# Patient Record
Sex: MALE | Race: WHITE | Employment: FULL TIME | URBAN - METROPOLITAN AREA
[De-identification: names, ages, dates, MRNs, and addresses within clinical notes are randomized per-mention and may not be internally consistent; named-entity substitution may affect disease eponyms.]

---

## 2020-01-30 NOTE — LETTER
AUTHORIZATION FOR SURGICAL OPERATION OR OTHER PROCEDURE    1. I hereby authorize Dr.Alex Winston Pardo and the [facility] staff assigned to my case to perform the following operation and/or procedure at the [facility]:   RIGHT subacromial CSI        2. My physician has explained the nature and purpose of the operation or other procedure, possible alternative methods of treatment, the risks involved, and the possibility of complication to me. I acknowledge that no guarantee has been made as to the result that may be obtained. 3.  I recognize that, during the course of this operation, or other procedure, unforseen conditions may necessitate additional or different procedure than those listed above. I, therefore, further authorize and request that the above named physician, his/her physician assistants or designees perform such procedures as are, in his/her professional opinion, necessary and desirable. 4.  Any tissue or organs removed in the operation or other procedure may be disposed of by and at the discretion of the [facility] staff and [facility]. 5.  I understand that in the event of a medical emergency, I will be transported by local paramedics to [facility] or other hospital emergency department. 6.  I certify that I have read and fully understand the above consent to operation and/or other procedure. 7.  I acknowledge that my physician has explained sedation/analgesia administration to me including the risks and benefits. I consent to the administration of sedation/analgesia as may be necessary or desirable in the judgement of my physician. Witness signature: ___________________________________________________ Date:  ______/______/_____                    Time:  ________ A. M.  P.M.        Patient Name:  Jarrell Dunham  12/13/1985  [id number]  ______________________________________________________  (please print)       Patient signature: ___________________________________________________             Relationship to Patient:           []  Parent    Responsible person                          []  Spouse  In case of minor or                    [] Other  _____________   Incompetent name:  __________________________________________________                               (please print)      _____________      Responsible person  In case of minor or  Incompetent signature:  _______________________________________________    Statement of Physician  My signature below affirms that prior to the time of the procedure, I have explained to the patient and/or his/her guardian, the risks and benefits involved in the proposed treatment and any reasonable alternative to the proposed treatment. I have also explained the risks and benefits involved in the refusal of the proposed treatment and have answered the patient's questions.                         Date:  ______/______/_______  Provider                      Signature:  __________________________________________________________       Time:  ___________ A.M    P.M. clear

## 2020-05-08 NOTE — PROGRESS NOTES
HPI:    Patient ID: Ridge Gonzalez is a 37year old male. HPI  Comes in today for establishing care and needing refill on his medications.   Denies any symptoms he just needs refill on his meds was removed to [place].  History of ADHD generalized anxiety a He is in respiratory distress. Neurological: He is alert and oriented to person, place, and time.               ASSESSMENT/PLAN:   Attention deficit hyperactivity disorder (adhd), unspecified adhd type  (primary encounter diagnosis)- will refill medicatio

## 2020-06-08 NOTE — PROGRESS NOTES
HPI:    Patient ID: Franca Anguiano is a 37year old male.     HPI  Comes in today for first time to establish care in office recently readmitted over visual telephone visit recently moved [facility] from [facility] and he needs a new physician refill on his meds he Mother    • Prostate Cancer Father    • Colon Cancer Father    • No Known Problems Brother    • Colon Cancer Maternal Grandfather    • Heart Disorder Maternal Grandfather       Social History: Social History    Tobacco Use      Smoking status: Former Smoke labs    No orders of the defined types were placed in this encounter.       Meds This Visit:  Requested Prescriptions     Signed Prescriptions Disp Refills   • amphetamine-dextroamphetamine 20 MG Oral Tab 60 tablet 0     Sig: Take 1 tablet by mouth 2 (two)

## 2020-07-01 ENCOUNTER — TELEPHONE (OUTPATIENT)
Dept: INTERNAL MEDICINE CLINIC | Facility: CLINIC | Age: 35
End: 2020-07-01

## 2020-07-01 RX ORDER — CLONAZEPAM 0.5 MG/1
0.5 TABLET ORAL 3 TIMES DAILY
Qty: 90 TABLET | Refills: 0 | Status: SHIPPED | OUTPATIENT
Start: 2020-07-05 | End: 2020-07-30

## 2020-07-01 RX ORDER — DEXTROAMPHETAMINE SACCHARATE, AMPHETAMINE ASPARTATE, DEXTROAMPHETAMINE SULFATE AND AMPHETAMINE SULFATE 5; 5; 5; 5 MG/1; MG/1; MG/1; MG/1
1 TABLET ORAL 2 TIMES DAILY
Qty: 60 TABLET | Refills: 0 | Status: SHIPPED | OUTPATIENT
Start: 2020-07-05 | End: 2020-07-02

## 2020-07-01 RX ORDER — ZOLPIDEM TARTRATE 12.5 MG/1
12.5 TABLET, FILM COATED, EXTENDED RELEASE ORAL NIGHTLY
Qty: 30 TABLET | Refills: 0 | Status: SHIPPED | OUTPATIENT
Start: 2020-07-05 | End: 2020-07-30

## 2020-07-01 NOTE — TELEPHONE ENCOUNTER
Dr. Cris Diop: patient states the pharmacy can dispense two days early that 7/5/20. He is leaving town. And he asked if you can approve to early refill.

## 2020-07-01 NOTE — TELEPHONE ENCOUNTER
clonazePAM 0.5 MG Oral Tab    Zolpidem Tartrate ER 12.5 MG Oral Tab CR     amphetamine-dextroamphetamine 20 MG Oral Tab       Patient calling for refill on medication       Please advise   [phone]

## 2020-07-02 RX ORDER — DEXTROAMPHETAMINE SACCHARATE, AMPHETAMINE ASPARTATE, DEXTROAMPHETAMINE SULFATE AND AMPHETAMINE SULFATE 5; 5; 5; 5 MG/1; MG/1; MG/1; MG/1
1 TABLET ORAL 2 TIMES DAILY
Qty: 60 TABLET | Refills: 0 | Status: SHIPPED | OUTPATIENT
Start: 2020-07-03 | End: 2020-07-30

## 2020-07-02 NOTE — TELEPHONE ENCOUNTER
spoke with pharmacist Julia Villalobos   States needs new prescription for adderall  Please write on comments ok for early refill  Previous prescription is locked and cannot refill until 7/5/20 that is why they need new Rx with comments \"ok for early refill\"  Thank

## 2020-07-03 NOTE — TELEPHONE ENCOUNTER
Spoke with patient and informed that Adderal was sent today to his pharmacy and just wait for his pharmacy to call for ,

## 2020-07-30 ENCOUNTER — OFFICE VISIT (OUTPATIENT)
Dept: INTERNAL MEDICINE CLINIC | Facility: CLINIC | Age: 35
End: 2020-07-30
Payer: COMMERCIAL

## 2020-07-30 VITALS
HEIGHT: 71 IN | BODY MASS INDEX: 22.68 KG/M2 | DIASTOLIC BLOOD PRESSURE: 62 MMHG | OXYGEN SATURATION: 99 % | WEIGHT: 162 LBS | SYSTOLIC BLOOD PRESSURE: 126 MMHG | RESPIRATION RATE: 18 BRPM | TEMPERATURE: 98 F | HEART RATE: 80 BPM

## 2020-07-30 DIAGNOSIS — G47.00 INSOMNIA, UNSPECIFIED TYPE: ICD-10-CM

## 2020-07-30 DIAGNOSIS — F41.1 GENERALIZED ANXIETY DISORDER: ICD-10-CM

## 2020-07-30 DIAGNOSIS — M25.511 ACUTE PAIN OF RIGHT SHOULDER: Primary | ICD-10-CM

## 2020-07-30 DIAGNOSIS — F90.9 ATTENTION DEFICIT HYPERACTIVITY DISORDER (ADHD), UNSPECIFIED ADHD TYPE: ICD-10-CM

## 2020-07-30 PROCEDURE — 3078F DIAST BP <80 MM HG: CPT | Performed by: INTERNAL MEDICINE

## 2020-07-30 PROCEDURE — 99214 OFFICE O/P EST MOD 30 MIN: CPT | Performed by: INTERNAL MEDICINE

## 2020-07-30 PROCEDURE — 3074F SYST BP LT 130 MM HG: CPT | Performed by: INTERNAL MEDICINE

## 2020-07-30 PROCEDURE — 3008F BODY MASS INDEX DOCD: CPT | Performed by: INTERNAL MEDICINE

## 2020-07-30 RX ORDER — CLONAZEPAM 0.5 MG/1
0.5 TABLET ORAL 3 TIMES DAILY
Qty: 90 TABLET | Refills: 0 | Status: SHIPPED | OUTPATIENT
Start: 2020-07-30 | End: 2020-08-28

## 2020-07-30 RX ORDER — DEXTROAMPHETAMINE SACCHARATE, AMPHETAMINE ASPARTATE, DEXTROAMPHETAMINE SULFATE AND AMPHETAMINE SULFATE 5; 5; 5; 5 MG/1; MG/1; MG/1; MG/1
1 TABLET ORAL 2 TIMES DAILY
Qty: 60 TABLET | Refills: 0 | Status: SHIPPED | OUTPATIENT
Start: 2020-07-30 | End: 2020-08-28

## 2020-07-30 RX ORDER — ZOLPIDEM TARTRATE 12.5 MG/1
12.5 TABLET, FILM COATED, EXTENDED RELEASE ORAL NIGHTLY
Qty: 30 TABLET | Refills: 0 | Status: SHIPPED | OUTPATIENT
Start: 2020-07-30 | End: 2020-08-28

## 2020-08-04 NOTE — PROGRESS NOTES
HPI:    Patient ID: Kiki Zhou is a 37year old male.     HPI  Presents for follow-up also is been having some right shoulder pain he is not sure how he hurt it was hurting for few weeks but the last few days is gotten better he has been icing it taking as 2000        Years since quittin.6      Smokeless tobacco: Never Used    Alcohol use: Yes      Frequency: Monthly or less      Comment: weekends    Drug use: Never       PHYSICAL EXAM:    Physical Exam   Constitutional: He is oriented to person, place, 3 (three) times daily. • amphetamine-dextroamphetamine 20 MG Oral Tab 60 tablet 0     Sig: Take 1 tablet by mouth 2 (two) times a day.        Imaging & Referrals:  None        #[id number]

## 2020-08-28 ENCOUNTER — OFFICE VISIT (OUTPATIENT)
Dept: INTERNAL MEDICINE CLINIC | Facility: CLINIC | Age: 35
End: 2020-08-28
Payer: COMMERCIAL

## 2020-08-28 VITALS
DIASTOLIC BLOOD PRESSURE: 66 MMHG | HEART RATE: 68 BPM | WEIGHT: 158 LBS | BODY MASS INDEX: 22.12 KG/M2 | TEMPERATURE: 98 F | HEIGHT: 71 IN | SYSTOLIC BLOOD PRESSURE: 120 MMHG | RESPIRATION RATE: 18 BRPM | OXYGEN SATURATION: 99 %

## 2020-08-28 DIAGNOSIS — F41.1 GENERALIZED ANXIETY DISORDER: ICD-10-CM

## 2020-08-28 DIAGNOSIS — G47.00 INSOMNIA, UNSPECIFIED TYPE: ICD-10-CM

## 2020-08-28 DIAGNOSIS — Z00.00 ANNUAL PHYSICAL EXAM: Primary | ICD-10-CM

## 2020-08-28 DIAGNOSIS — F90.9 ATTENTION DEFICIT HYPERACTIVITY DISORDER (ADHD), UNSPECIFIED ADHD TYPE: ICD-10-CM

## 2020-08-28 PROBLEM — Z80.42 FAMILY HISTORY OF PROSTATE CANCER IN FATHER: Status: ACTIVE | Noted: 2019-06-19

## 2020-08-28 PROBLEM — F41.9 ANXIETY: Status: ACTIVE | Noted: 2019-04-03

## 2020-08-28 PROCEDURE — 3074F SYST BP LT 130 MM HG: CPT | Performed by: INTERNAL MEDICINE

## 2020-08-28 PROCEDURE — 99395 PREV VISIT EST AGE 18-39: CPT | Performed by: INTERNAL MEDICINE

## 2020-08-28 PROCEDURE — 3008F BODY MASS INDEX DOCD: CPT | Performed by: INTERNAL MEDICINE

## 2020-08-28 PROCEDURE — 3078F DIAST BP <80 MM HG: CPT | Performed by: INTERNAL MEDICINE

## 2020-08-28 RX ORDER — DEXTROAMPHETAMINE SACCHARATE, AMPHETAMINE ASPARTATE, DEXTROAMPHETAMINE SULFATE AND AMPHETAMINE SULFATE 5; 5; 5; 5 MG/1; MG/1; MG/1; MG/1
1 TABLET ORAL 2 TIMES DAILY
Qty: 60 TABLET | Refills: 0 | Status: SHIPPED | OUTPATIENT
Start: 2020-08-28 | End: 2020-09-25

## 2020-08-28 RX ORDER — ZOLPIDEM TARTRATE 12.5 MG/1
12.5 TABLET, FILM COATED, EXTENDED RELEASE ORAL NIGHTLY
Qty: 30 TABLET | Refills: 0 | Status: SHIPPED | OUTPATIENT
Start: 2020-08-28 | End: 2020-09-25

## 2020-08-28 RX ORDER — CLONAZEPAM 0.5 MG/1
0.5 TABLET ORAL 3 TIMES DAILY
Qty: 90 TABLET | Refills: 0 | Status: SHIPPED | OUTPATIENT
Start: 2020-08-28 | End: 2020-09-25

## 2020-08-28 NOTE — PROGRESS NOTES
HPI:    Patient ID: Mona Hector is a 37year old male. HPI  Patient comes in for annual physical overall doing okay denies any complaints. Also needs refill on his meds. Review of Systems   Constitutional: Negative. Negative for fatigue and fever. Monthly or less      Comment: weekends    Drug use: Never       PHYSICAL EXAM:    Physical Exam   Constitutional: He is oriented to person, place, and time. He appears well-developed and well-nourished. HENT:   Head: Normocephalic and atraumatic.    Eyes: nightly. • clonazePAM 0.5 MG Oral Tab 90 tablet 0     Sig: Take 1 tablet (0.5 mg total) by mouth 3 (three) times daily. • amphetamine-dextroamphetamine 20 MG Oral Tab 60 tablet 0     Sig: Take 1 tablet by mouth 2 (two) times a day.        Imaging & Refe

## 2020-09-08 ENCOUNTER — OFFICE VISIT (OUTPATIENT)
Dept: INTERNAL MEDICINE CLINIC | Facility: CLINIC | Age: 35
End: 2020-09-08
Payer: COMMERCIAL

## 2020-09-08 VITALS
DIASTOLIC BLOOD PRESSURE: 68 MMHG | RESPIRATION RATE: 16 BRPM | SYSTOLIC BLOOD PRESSURE: 120 MMHG | HEIGHT: 71 IN | BODY MASS INDEX: 22.54 KG/M2 | TEMPERATURE: 98 F | WEIGHT: 161 LBS | OXYGEN SATURATION: 99 % | HEART RATE: 64 BPM

## 2020-09-08 DIAGNOSIS — M21.42 FLAT FEET, BILATERAL: ICD-10-CM

## 2020-09-08 DIAGNOSIS — L02.512 ABSCESS OF FINGER OF LEFT HAND: Primary | ICD-10-CM

## 2020-09-08 DIAGNOSIS — M21.41 FLAT FEET, BILATERAL: ICD-10-CM

## 2020-09-08 PROCEDURE — 99214 OFFICE O/P EST MOD 30 MIN: CPT | Performed by: INTERNAL MEDICINE

## 2020-09-08 PROCEDURE — 3074F SYST BP LT 130 MM HG: CPT | Performed by: INTERNAL MEDICINE

## 2020-09-08 PROCEDURE — 3008F BODY MASS INDEX DOCD: CPT | Performed by: INTERNAL MEDICINE

## 2020-09-08 PROCEDURE — 3078F DIAST BP <80 MM HG: CPT | Performed by: INTERNAL MEDICINE

## 2020-09-08 RX ORDER — ACETAMINOPHEN AND CODEINE PHOSPHATE 300; 30 MG/1; MG/1
1 TABLET ORAL EVERY 6 HOURS PRN
Qty: 15 TABLET | Refills: 0 | Status: SHIPPED | OUTPATIENT
Start: 2020-09-08 | End: 2021-02-20

## 2020-09-08 RX ORDER — CLINDAMYCIN HYDROCHLORIDE 300 MG/1
300 CAPSULE ORAL 3 TIMES DAILY
Qty: 30 CAPSULE | Refills: 0 | Status: SHIPPED | OUTPATIENT
Start: 2020-09-08 | End: 2020-09-18

## 2020-09-09 NOTE — PROGRESS NOTES
HPI:    Patient ID: Zonia Beckham is a 37year old male. HPI  Patient comes in today with complaint of finger swelling pain for about a week and left hand if it is getting get better he tried to soak it in warm water but nothing helped.   The pain at times day. 60 tablet 0     Allergies:No Known Allergies    HISTORY:  No past medical history on file. No past surgical history on file.    Family History   Problem Relation Age of Onset   • High Blood Pressure Mother    • Prostate Cancer Father    • Colon Cance discharge came out patient felt immediate relief. Flat feet, bilateral patient will see the podiatrist    No orders of the defined types were placed in this encounter.       Meds This Visit:  Requested Prescriptions     Signed Prescriptions Disp Refills

## 2020-09-16 NOTE — TELEPHONE ENCOUNTER
Patient seen in [address] [place] [address] Rd 9/8 for abscess to finger, was prescribed Bactroban oint. Pt is out of oint and requesting refill. Please advise how long patient should be using oint.

## 2020-09-16 NOTE — TELEPHONE ENCOUNTER
Patient is requesting a refill for bactroban. Please advise.        mupirocin (BACTROBAN) 2 % External Ointment 15 g

## 2020-09-21 ENCOUNTER — LAB ENCOUNTER (OUTPATIENT)
Dept: LAB | Facility: HOSPITAL | Age: 35
End: 2020-09-21
Attending: INTERNAL MEDICINE
Payer: COMMERCIAL

## 2020-09-21 DIAGNOSIS — Z00.00 ANNUAL PHYSICAL EXAM: ICD-10-CM

## 2020-09-21 LAB
ALBUMIN SERPL-MCNC: 4.2 G/DL (ref 3.4–5)
ALBUMIN/GLOB SERPL: 1.3 {RATIO} (ref 1–2)
ALP LIVER SERPL-CCNC: 98 U/L
ALT SERPL-CCNC: 57 U/L
ANION GAP SERPL CALC-SCNC: 4 MMOL/L (ref 0–18)
AST SERPL-CCNC: 50 U/L (ref 15–37)
BACTERIA UR QL AUTO: NEGATIVE /HPF
BASOPHILS # BLD AUTO: 0.04 X10(3) UL (ref 0–0.2)
BASOPHILS NFR BLD AUTO: 0.6 %
BILIRUB SERPL-MCNC: 0.5 MG/DL (ref 0.1–2)
BILIRUB UR QL: NEGATIVE
BUN BLD-MCNC: 17 MG/DL (ref 7–18)
BUN/CREAT SERPL: 13 (ref 10–20)
CALCIUM BLD-MCNC: 9.2 MG/DL (ref 8.5–10.1)
CHLORIDE SERPL-SCNC: 102 MMOL/L (ref 98–112)
CHOLEST SMN-MCNC: 158 MG/DL (ref ?–200)
CO2 SERPL-SCNC: 32 MMOL/L (ref 21–32)
COLOR UR: YELLOW
CREAT BLD-MCNC: 1.31 MG/DL
DEPRECATED RDW RBC AUTO: 38.7 FL (ref 35.1–46.3)
EOSINOPHIL # BLD AUTO: 0.03 X10(3) UL (ref 0–0.7)
EOSINOPHIL NFR BLD AUTO: 0.5 %
ERYTHROCYTE [DISTWIDTH] IN BLOOD BY AUTOMATED COUNT: 12 % (ref 11–15)
GLOBULIN PLAS-MCNC: 3.2 G/DL (ref 2.8–4.4)
GLUCOSE BLD-MCNC: 101 MG/DL (ref 70–99)
GLUCOSE UR-MCNC: NEGATIVE MG/DL
HCT VFR BLD AUTO: 42.5 %
HDLC SERPL-MCNC: 79 MG/DL (ref 40–59)
HGB BLD-MCNC: 14.8 G/DL
HGB UR QL STRIP.AUTO: NEGATIVE
HYALINE CASTS #/AREA URNS AUTO: 1 /LPF
IMM GRANULOCYTES # BLD AUTO: 0.03 X10(3) UL (ref 0–1)
IMM GRANULOCYTES NFR BLD: 0.5 %
KETONES UR-MCNC: NEGATIVE MG/DL
LDLC SERPL CALC-MCNC: 72 MG/DL (ref ?–100)
LEUKOCYTE ESTERASE UR QL STRIP.AUTO: NEGATIVE
LYMPHOCYTES # BLD AUTO: 1.74 X10(3) UL (ref 1–4)
LYMPHOCYTES NFR BLD AUTO: 26.2 %
M PROTEIN MFR SERPL ELPH: 7.4 G/DL (ref 6.4–8.2)
MCH RBC QN AUTO: 30.5 PG (ref 26–34)
MCHC RBC AUTO-ENTMCNC: 34.8 G/DL (ref 31–37)
MCV RBC AUTO: 87.4 FL
MONOCYTES # BLD AUTO: 0.54 X10(3) UL (ref 0.1–1)
MONOCYTES NFR BLD AUTO: 8.1 %
NEUTROPHILS # BLD AUTO: 4.27 X10 (3) UL (ref 1.5–7.7)
NEUTROPHILS # BLD AUTO: 4.27 X10(3) UL (ref 1.5–7.7)
NEUTROPHILS NFR BLD AUTO: 64.1 %
NITRITE UR QL STRIP.AUTO: NEGATIVE
NONHDLC SERPL-MCNC: 79 MG/DL (ref ?–130)
OSMOLALITY SERPL CALC.SUM OF ELEC: 288 MOSM/KG (ref 275–295)
PATIENT FASTING Y/N/NP: YES
PATIENT FASTING Y/N/NP: YES
PH UR: 7 [PH] (ref 5–8)
PLATELET # BLD AUTO: 257 10(3)UL (ref 150–450)
POTASSIUM SERPL-SCNC: 4.1 MMOL/L (ref 3.5–5.1)
PROT UR-MCNC: NEGATIVE MG/DL
RBC # BLD AUTO: 4.86 X10(6)UL
RBC #/AREA URNS AUTO: 1 /HPF
SODIUM SERPL-SCNC: 138 MMOL/L (ref 136–145)
SP GR UR STRIP: 1.02 (ref 1–1.03)
TRIGL SERPL-MCNC: 33 MG/DL (ref 30–149)
TSI SER-ACNC: 1.71 MIU/ML (ref 0.36–3.74)
UROBILINOGEN UR STRIP-ACNC: <2
VLDLC SERPL CALC-MCNC: 7 MG/DL (ref 0–30)
WBC # BLD AUTO: 6.7 X10(3) UL (ref 4–11)
WBC #/AREA URNS AUTO: 1 /HPF

## 2020-09-21 PROCEDURE — 36415 COLL VENOUS BLD VENIPUNCTURE: CPT

## 2020-09-21 PROCEDURE — 80053 COMPREHEN METABOLIC PANEL: CPT

## 2020-09-21 PROCEDURE — 81001 URINALYSIS AUTO W/SCOPE: CPT

## 2020-09-21 PROCEDURE — 80061 LIPID PANEL: CPT

## 2020-09-21 PROCEDURE — 84443 ASSAY THYROID STIM HORMONE: CPT

## 2020-09-21 PROCEDURE — 85025 COMPLETE CBC W/AUTO DIFF WBC: CPT

## 2020-09-25 ENCOUNTER — OFFICE VISIT (OUTPATIENT)
Dept: INTERNAL MEDICINE CLINIC | Facility: CLINIC | Age: 35
End: 2020-09-25
Payer: COMMERCIAL

## 2020-09-25 VITALS
WEIGHT: 165 LBS | TEMPERATURE: 97 F | BODY MASS INDEX: 23 KG/M2 | HEART RATE: 67 BPM | SYSTOLIC BLOOD PRESSURE: 110 MMHG | DIASTOLIC BLOOD PRESSURE: 60 MMHG | OXYGEN SATURATION: 98 %

## 2020-09-25 DIAGNOSIS — F41.9 ANXIETY: ICD-10-CM

## 2020-09-25 DIAGNOSIS — G47.00 INSOMNIA, UNSPECIFIED TYPE: ICD-10-CM

## 2020-09-25 DIAGNOSIS — F41.1 GENERALIZED ANXIETY DISORDER: ICD-10-CM

## 2020-09-25 DIAGNOSIS — F98.8 ATTENTION DEFICIT DISORDER, UNSPECIFIED HYPERACTIVITY PRESENCE: ICD-10-CM

## 2020-09-25 DIAGNOSIS — N28.9 RENAL INSUFFICIENCY: ICD-10-CM

## 2020-09-25 DIAGNOSIS — R79.89 ELEVATED LFTS: Primary | ICD-10-CM

## 2020-09-25 PROCEDURE — 3078F DIAST BP <80 MM HG: CPT | Performed by: INTERNAL MEDICINE

## 2020-09-25 PROCEDURE — 99214 OFFICE O/P EST MOD 30 MIN: CPT | Performed by: INTERNAL MEDICINE

## 2020-09-25 PROCEDURE — 3074F SYST BP LT 130 MM HG: CPT | Performed by: INTERNAL MEDICINE

## 2020-09-25 RX ORDER — SULFAMETHOXAZOLE AND TRIMETHOPRIM 800; 160 MG/1; MG/1
1 TABLET ORAL 2 TIMES DAILY
Qty: 14 TABLET | Refills: 0 | Status: SHIPPED | OUTPATIENT
Start: 2020-09-25 | End: 2020-10-02

## 2020-09-25 RX ORDER — ZOLPIDEM TARTRATE 12.5 MG/1
12.5 TABLET, FILM COATED, EXTENDED RELEASE ORAL NIGHTLY
Qty: 30 TABLET | Refills: 0 | Status: SHIPPED | OUTPATIENT
Start: 2020-09-25 | End: 2020-10-23

## 2020-09-25 RX ORDER — DEXTROAMPHETAMINE SACCHARATE, AMPHETAMINE ASPARTATE, DEXTROAMPHETAMINE SULFATE AND AMPHETAMINE SULFATE 5; 5; 5; 5 MG/1; MG/1; MG/1; MG/1
1 TABLET ORAL 2 TIMES DAILY
Qty: 60 TABLET | Refills: 0 | Status: SHIPPED | OUTPATIENT
Start: 2020-09-25 | End: 2020-10-23

## 2020-09-25 RX ORDER — CLONAZEPAM 0.5 MG/1
0.5 TABLET ORAL 3 TIMES DAILY
Qty: 90 TABLET | Refills: 0 | Status: SHIPPED | OUTPATIENT
Start: 2020-09-25 | End: 2020-10-23

## 2020-09-25 NOTE — PROGRESS NOTES
HPI:    Patient ID: Milly Acevedo is a 37year old male. HPI  Is in today for follow-up. Continues to come in a day or 2 of fluids refill has been doing this for few months now.   Labs noted with a mild elevated LFTs also HDL elevated spoke with patient a tablet by mouth every 6 (six) hours as needed for Pain. 15 tablet 0     Allergies:No Known Allergies    HISTORY:  No past medical history on file. No past surgical history on file.    Family History   Problem Relation Age of Onset   • High Blood Pressure these medications we will repeat some labs  Attention deficit disorder, unspecified hyperactivity presence continue with current treatment  Anxiety continue with current treatment  Generalized anxiety disorder continue with current treatment take medicatio

## 2020-10-12 ENCOUNTER — APPOINTMENT (OUTPATIENT)
Dept: LAB | Age: 35
End: 2020-10-12
Payer: COMMERCIAL

## 2020-10-12 ENCOUNTER — LAB ENCOUNTER (OUTPATIENT)
Dept: LAB | Facility: HOSPITAL | Age: 35
End: 2020-10-12
Attending: INTERNAL MEDICINE
Payer: COMMERCIAL

## 2020-10-12 DIAGNOSIS — F98.8 ATTENTION DEFICIT DISORDER, UNSPECIFIED HYPERACTIVITY PRESENCE: ICD-10-CM

## 2020-10-12 DIAGNOSIS — F41.9 ANXIETY: ICD-10-CM

## 2020-10-12 DIAGNOSIS — R79.89 ELEVATED LFTS: ICD-10-CM

## 2020-10-12 DIAGNOSIS — F41.1 GENERALIZED ANXIETY DISORDER: ICD-10-CM

## 2020-10-12 DIAGNOSIS — G47.00 INSOMNIA, UNSPECIFIED TYPE: ICD-10-CM

## 2020-10-12 PROCEDURE — 36415 COLL VENOUS BLD VENIPUNCTURE: CPT

## 2020-10-12 PROCEDURE — 82728 ASSAY OF FERRITIN: CPT

## 2020-10-12 PROCEDURE — 86704 HEP B CORE ANTIBODY TOTAL: CPT

## 2020-10-12 PROCEDURE — 86708 HEPATITIS A ANTIBODY: CPT

## 2020-10-12 PROCEDURE — 87340 HEPATITIS B SURFACE AG IA: CPT

## 2020-10-12 PROCEDURE — 86803 HEPATITIS C AB TEST: CPT

## 2020-10-12 PROCEDURE — 80053 COMPREHEN METABOLIC PANEL: CPT | Performed by: INTERNAL MEDICINE

## 2020-10-12 PROCEDURE — 80500 HEPATITIS A B + C PROFILE: CPT

## 2020-10-12 PROCEDURE — 86038 ANTINUCLEAR ANTIBODIES: CPT

## 2020-10-12 PROCEDURE — 86706 HEP B SURFACE ANTIBODY: CPT

## 2020-10-12 PROCEDURE — 86039 ANTINUCLEAR ANTIBODIES (ANA): CPT

## 2020-10-20 ENCOUNTER — TELEPHONE (OUTPATIENT)
Dept: OTHER | Age: 35
End: 2020-10-20

## 2020-10-20 NOTE — TELEPHONE ENCOUNTER
Received call back from the patient. Reviewed chart. Noted below result not and relayed message. Wayne Garcias MD   10/18/2020  2:06 PM      Farzana + and titer slight elevated, ??  Non specific but should see the rheumatologist     Patient verbalized und

## 2020-10-23 NOTE — PROGRESS NOTES
HPI:    Patient ID: Faisal House is a 37year old male.     HPI  Patient comes in for follow-up overall he is doing okay denies any complaints today his labs are better liver enzymes back to normal renal function normal since he was really dehydrated t topically 3 (three) times daily. 15 g 0   • Acetaminophen-Codeine (TYLENOL WITH CODEINE #3) 300-30 MG Oral Tab Take 1 tablet by mouth every 6 (six) hours as needed for Pain.  15 tablet 0     Allergies:No Known Allergies    HISTORY:  No past medical history Attention deficit disorder, unspecified hyperactivity presence  (primary encounter diagnosis)-continue with current treatment explained to patient that we can refill the medication on the same date every month from now on going forward  Elevated lfts res

## 2020-11-07 ENCOUNTER — TELEPHONE (OUTPATIENT)
Dept: INTERNAL MEDICINE CLINIC | Facility: CLINIC | Age: 35
End: 2020-11-07

## 2020-11-07 NOTE — TELEPHONE ENCOUNTER
Pt coming in 11/25 for 2nd Hep B vaccine. Please place order in [facility]     Thanks.

## 2020-11-09 ENCOUNTER — OFFICE VISIT (OUTPATIENT)
Dept: RHEUMATOLOGY | Facility: CLINIC | Age: 35
End: 2020-11-09
Payer: COMMERCIAL

## 2020-11-09 VITALS
BODY MASS INDEX: 22.16 KG/M2 | HEIGHT: 71 IN | WEIGHT: 158.31 LBS | SYSTOLIC BLOOD PRESSURE: 142 MMHG | DIASTOLIC BLOOD PRESSURE: 84 MMHG | HEART RATE: 87 BPM

## 2020-11-09 DIAGNOSIS — R76.8 POSITIVE ANA (ANTINUCLEAR ANTIBODY): Primary | ICD-10-CM

## 2020-11-09 PROCEDURE — 3077F SYST BP >= 140 MM HG: CPT | Performed by: INTERNAL MEDICINE

## 2020-11-09 PROCEDURE — 3008F BODY MASS INDEX DOCD: CPT | Performed by: INTERNAL MEDICINE

## 2020-11-09 PROCEDURE — 99243 OFF/OP CNSLTJ NEW/EST LOW 30: CPT | Performed by: INTERNAL MEDICINE

## 2020-11-09 PROCEDURE — 3079F DIAST BP 80-89 MM HG: CPT | Performed by: INTERNAL MEDICINE

## 2020-11-09 PROCEDURE — 99072 ADDL SUPL MATRL&STAF TM PHE: CPT | Performed by: INTERNAL MEDICINE

## 2020-11-09 RX ORDER — TRAZODONE HYDROCHLORIDE 150 MG/1
150 TABLET ORAL AS NEEDED
COMMUNITY
Start: 2020-10-30

## 2020-11-09 RX ORDER — ESCITALOPRAM OXALATE 10 MG/1
10 TABLET ORAL DAILY
COMMUNITY
Start: 2020-10-30

## 2020-11-23 RX ORDER — ZOLPIDEM TARTRATE 12.5 MG/1
12.5 TABLET, FILM COATED, EXTENDED RELEASE ORAL NIGHTLY
Qty: 30 TABLET | Refills: 0 | OUTPATIENT
Start: 2020-11-23

## 2020-11-23 RX ORDER — ZOLPIDEM TARTRATE 12.5 MG/1
12.5 TABLET, FILM COATED, EXTENDED RELEASE ORAL NIGHTLY
Qty: 30 TABLET | Refills: 0 | Status: CANCELLED | OUTPATIENT
Start: 2020-11-23

## 2020-11-23 RX ORDER — CLONAZEPAM 0.5 MG/1
0.5 TABLET ORAL 3 TIMES DAILY
Qty: 90 TABLET | Refills: 0 | OUTPATIENT
Start: 2020-11-23

## 2020-11-23 RX ORDER — DEXTROAMPHETAMINE SACCHARATE, AMPHETAMINE ASPARTATE, DEXTROAMPHETAMINE SULFATE AND AMPHETAMINE SULFATE 5; 5; 5; 5 MG/1; MG/1; MG/1; MG/1
1 TABLET ORAL 2 TIMES DAILY
Qty: 60 TABLET | Refills: 0 | Status: CANCELLED | OUTPATIENT
Start: 2020-11-23

## 2020-11-23 RX ORDER — CLONAZEPAM 0.5 MG/1
0.5 TABLET ORAL 3 TIMES DAILY
Qty: 90 TABLET | Refills: 0 | Status: CANCELLED | OUTPATIENT
Start: 2020-11-23

## 2020-11-23 NOTE — TELEPHONE ENCOUNTER
Patient is requesting a refill for zolpidem, clonazepam, and adderall. Patient is out of medication. Please advise.        Zolpidem Tartrate ER 12.5 MG Oral Tab CR 30 tablet     clonazePAM 0.5 MG Oral Tab 90 tablet     amphetamine-dextroamphetamine 20 MG Or

## 2020-11-25 ENCOUNTER — NURSE ONLY (OUTPATIENT)
Dept: INTERNAL MEDICINE CLINIC | Facility: CLINIC | Age: 35
End: 2020-11-25
Payer: COMMERCIAL

## 2020-11-25 DIAGNOSIS — Z92.29 HAS RECEIVED SECOND DOSE OF HEPATITIS B VACCINE: Primary | ICD-10-CM

## 2020-11-25 PROCEDURE — 90746 HEPB VACCINE 3 DOSE ADULT IM: CPT | Performed by: INTERNAL MEDICINE

## 2020-11-25 PROCEDURE — 90471 IMMUNIZATION ADMIN: CPT | Performed by: INTERNAL MEDICINE

## 2020-12-21 RX ORDER — DEXTROAMPHETAMINE SACCHARATE, AMPHETAMINE ASPARTATE, DEXTROAMPHETAMINE SULFATE AND AMPHETAMINE SULFATE 5; 5; 5; 5 MG/1; MG/1; MG/1; MG/1
1 TABLET ORAL 2 TIMES DAILY
Qty: 60 TABLET | Refills: 0 | Status: SHIPPED | OUTPATIENT
Start: 2020-12-24 | End: 2021-01-23

## 2020-12-21 RX ORDER — CLONAZEPAM 0.5 MG/1
0.5 TABLET ORAL 3 TIMES DAILY
Qty: 90 TABLET | Refills: 0 | Status: SHIPPED | OUTPATIENT
Start: 2020-12-24 | End: 2021-01-20

## 2020-12-21 RX ORDER — ZOLPIDEM TARTRATE 12.5 MG/1
12.5 TABLET, FILM COATED, EXTENDED RELEASE ORAL NIGHTLY
Qty: 30 TABLET | Refills: 0 | Status: SHIPPED | OUTPATIENT
Start: 2020-12-24 | End: 2021-01-20

## 2020-12-21 NOTE — TELEPHONE ENCOUNTER
Current Outpatient Medications   Medication Sig Dispense Refill   • Zolpidem Tartrate ER 12.5 MG Oral Tab CR Take 1 tablet (12.5 mg total) by mouth nightly.  30 tablet 0   • clonazePAM 0.5 MG Oral Tab Take 1 tablet (0.5 mg total) by mouth 3 (three) times da

## 2021-01-20 RX ORDER — DEXTROAMPHETAMINE SACCHARATE, AMPHETAMINE ASPARTATE, DEXTROAMPHETAMINE SULFATE AND AMPHETAMINE SULFATE 5; 5; 5; 5 MG/1; MG/1; MG/1; MG/1
20 TABLET ORAL 2 TIMES DAILY
Qty: 60 TABLET | Refills: 0 | Status: SHIPPED | OUTPATIENT
Start: 2021-01-23 | End: 2021-02-19

## 2021-01-20 RX ORDER — ZOLPIDEM TARTRATE 12.5 MG/1
12.5 TABLET, FILM COATED, EXTENDED RELEASE ORAL NIGHTLY
Qty: 30 TABLET | Refills: 0 | Status: SHIPPED | OUTPATIENT
Start: 2021-01-23 | End: 2021-02-19

## 2021-01-20 RX ORDER — DEXTROAMPHETAMINE SACCHARATE, AMPHETAMINE ASPARTATE, DEXTROAMPHETAMINE SULFATE AND AMPHETAMINE SULFATE 5; 5; 5; 5 MG/1; MG/1; MG/1; MG/1
20 TABLET ORAL 2 TIMES DAILY
Qty: 60 TABLET | Refills: 0 | OUTPATIENT
Start: 2021-02-20 | End: 2021-03-22

## 2021-01-20 RX ORDER — CLONAZEPAM 0.5 MG/1
0.5 TABLET ORAL 3 TIMES DAILY
Qty: 90 TABLET | Refills: 1 | Status: SHIPPED | OUTPATIENT
Start: 2021-01-23 | End: 2021-02-19

## 2021-01-20 RX ORDER — DEXTROAMPHETAMINE SACCHARATE, AMPHETAMINE ASPARTATE, DEXTROAMPHETAMINE SULFATE AND AMPHETAMINE SULFATE 5; 5; 5; 5 MG/1; MG/1; MG/1; MG/1
20 TABLET ORAL 2 TIMES DAILY
Qty: 60 TABLET | Refills: 0 | OUTPATIENT
Start: 2021-03-23 | End: 2021-04-22

## 2021-01-20 NOTE — TELEPHONE ENCOUNTER
Patient is requesting a refill for 3 medications:   clonazePAM 0.5 MG Oral Tab - out after 1/21    Zolpidem Tartrate ER 12.5MG Oral Tab    Amphetamine-dextroamphetamine 20MG Oral Tab  (Adderall)    Send to [facility] in [place]

## 2021-02-19 NOTE — PROGRESS NOTES
HPI:    Patient ID: Abimael Angel is a 38year old male.     HPI  Wrong note   Review of Systems      Current Outpatient Medications   Medication Sig Dispense Refill   • amphetamine-dextroamphetamine (ADDERALL) 20 MG Oral Tab Take 1 tablet (20 mg total)

## 2021-02-20 NOTE — PROGRESS NOTES
HPI:    Patient ID: Nixon Viramontes is a 38year old male. HPI  Patient comes in for follow-up overall is doing okay denies any complaints he says he and his wife  Are trying to get pregnant.   Taking medication as prescribed he says he is not drinking Reported on 11/9/2020 ) 15 tablet 0     Allergies:No Known Allergies    HISTORY:  No past medical history on file. No past surgical history on file.    Family History   Problem Relation Age of Onset   • High Blood Pressure Mother    • Prostate Cancer Fath stable taking medication as prescribed  Insomnia, unspecified type take medication as prescribed      No orders of the defined types were placed in this encounter.       Meds This Visit:  Requested Prescriptions     Signed Prescriptions Disp Refills   • amp

## 2021-03-19 RX ORDER — DEXTROAMPHETAMINE SACCHARATE, AMPHETAMINE ASPARTATE, DEXTROAMPHETAMINE SULFATE AND AMPHETAMINE SULFATE 5; 5; 5; 5 MG/1; MG/1; MG/1; MG/1
20 TABLET ORAL 2 TIMES DAILY
Qty: 60 TABLET | Refills: 0 | Status: SHIPPED | OUTPATIENT
Start: 2021-03-19 | End: 2021-04-15

## 2021-03-19 RX ORDER — CLONAZEPAM 0.5 MG/1
0.5 TABLET ORAL 3 TIMES DAILY
Qty: 90 TABLET | Refills: 1 | Status: SHIPPED | OUTPATIENT
Start: 2021-03-19 | End: 2021-04-15

## 2021-03-19 RX ORDER — ZOLPIDEM TARTRATE 12.5 MG/1
12.5 TABLET, FILM COATED, EXTENDED RELEASE ORAL NIGHTLY
Qty: 30 TABLET | Refills: 0 | Status: SHIPPED | OUTPATIENT
Start: 2021-03-19 | End: 2021-04-15

## 2021-03-19 NOTE — TELEPHONE ENCOUNTER
Per patient he needs a refill on his rx med and he is out of med:  amphetamine-dextroamphetamine (ADDERALL)    Zolpidem Tartrate ER   clonazePAM     Current Outpatient Medications   Medication Sig Dispense Refill   • amphetamine-dextroamphetamine (ADDERALL

## 2021-04-15 ENCOUNTER — OFFICE VISIT (OUTPATIENT)
Dept: INTERNAL MEDICINE CLINIC | Facility: CLINIC | Age: 36
End: 2021-04-15
Payer: COMMERCIAL

## 2021-04-15 VITALS
BODY MASS INDEX: 21.84 KG/M2 | WEIGHT: 156 LBS | DIASTOLIC BLOOD PRESSURE: 78 MMHG | SYSTOLIC BLOOD PRESSURE: 116 MMHG | TEMPERATURE: 98 F | HEIGHT: 71 IN | HEART RATE: 67 BPM | RESPIRATION RATE: 17 BRPM | OXYGEN SATURATION: 98 %

## 2021-04-15 DIAGNOSIS — F41.9 ANXIETY: ICD-10-CM

## 2021-04-15 DIAGNOSIS — G47.00 INSOMNIA, UNSPECIFIED TYPE: ICD-10-CM

## 2021-04-15 DIAGNOSIS — R21 RASH: Primary | ICD-10-CM

## 2021-04-15 DIAGNOSIS — F98.8 ATTENTION DEFICIT DISORDER, UNSPECIFIED HYPERACTIVITY PRESENCE: ICD-10-CM

## 2021-04-15 PROCEDURE — 99214 OFFICE O/P EST MOD 30 MIN: CPT | Performed by: INTERNAL MEDICINE

## 2021-04-15 PROCEDURE — 3074F SYST BP LT 130 MM HG: CPT | Performed by: INTERNAL MEDICINE

## 2021-04-15 PROCEDURE — 3008F BODY MASS INDEX DOCD: CPT | Performed by: INTERNAL MEDICINE

## 2021-04-15 PROCEDURE — 3078F DIAST BP <80 MM HG: CPT | Performed by: INTERNAL MEDICINE

## 2021-04-15 RX ORDER — CLOTRIMAZOLE 1 %
1 CREAM (GRAM) TOPICAL 2 TIMES DAILY
Qty: 40 G | Refills: 0 | Status: SHIPPED | OUTPATIENT
Start: 2021-04-15

## 2021-04-15 RX ORDER — DEXTROAMPHETAMINE SACCHARATE, AMPHETAMINE ASPARTATE, DEXTROAMPHETAMINE SULFATE AND AMPHETAMINE SULFATE 5; 5; 5; 5 MG/1; MG/1; MG/1; MG/1
20 TABLET ORAL 2 TIMES DAILY
Qty: 60 TABLET | Refills: 0 | Status: SHIPPED | OUTPATIENT
Start: 2021-04-18 | End: 2021-05-14

## 2021-04-15 RX ORDER — ZOLPIDEM TARTRATE 12.5 MG/1
12.5 TABLET, FILM COATED, EXTENDED RELEASE ORAL NIGHTLY
Qty: 30 TABLET | Refills: 0 | Status: SHIPPED | OUTPATIENT
Start: 2021-04-18 | End: 2021-05-14

## 2021-04-15 RX ORDER — CLONAZEPAM 0.5 MG/1
0.5 TABLET ORAL 3 TIMES DAILY
Qty: 90 TABLET | Refills: 1 | Status: SHIPPED | OUTPATIENT
Start: 2021-04-18 | End: 2021-05-14

## 2021-04-15 NOTE — PROGRESS NOTES
HPI/Subjective:   Patient ID: Norma Hilton is a 38year old male.     HPI    Patient comes in with complaint of rash to his left arm EEG is 1 lesion has been there for a week now not sure if it is a fungal but no other lesions anywhere else or anybody nursing note reviewed. Constitutional:       Appearance: He is well-developed. HENT:      Head: Normocephalic and atraumatic.       Right Ear: External ear normal.      Left Ear: External ear normal.      Nose: Nose normal.   Eyes:      Conjunctiva/scle times daily. • Zolpidem Tartrate ER 12.5 MG Oral Tab CR 30 tablet 0     Sig: Take 1 tablet (12.5 mg total) by mouth nightly. • clotrimazole 1 % External Cream 40 g 0     Sig: Apply 1 Application topically 2 (two) times daily.        Imaging & Referrals:

## 2021-05-14 RX ORDER — DEXTROAMPHETAMINE SACCHARATE, AMPHETAMINE ASPARTATE, DEXTROAMPHETAMINE SULFATE AND AMPHETAMINE SULFATE 5; 5; 5; 5 MG/1; MG/1; MG/1; MG/1
20 TABLET ORAL 2 TIMES DAILY
Qty: 60 TABLET | Refills: 0 | Status: SHIPPED | OUTPATIENT
Start: 2021-05-17 | End: 2021-06-16

## 2021-05-14 RX ORDER — ZOLPIDEM TARTRATE 12.5 MG/1
12.5 TABLET, FILM COATED, EXTENDED RELEASE ORAL NIGHTLY
Qty: 30 TABLET | Refills: 0 | Status: SHIPPED | OUTPATIENT
Start: 2021-05-17 | End: 2021-09-09

## 2021-05-14 RX ORDER — CLONAZEPAM 0.5 MG/1
0.5 TABLET ORAL 3 TIMES DAILY
Qty: 90 TABLET | Refills: 1 | Status: SHIPPED | OUTPATIENT
Start: 2021-05-17 | End: 2021-09-09

## 2021-05-14 NOTE — TELEPHONE ENCOUNTER
Patient called and advised he is running Very Low on the Medications listed below. Patient is requesting a refill. Please Advise. Please Use Pharmacy[id number] [address]   [address] [facility]    Medications Needed

## 2021-07-04 ENCOUNTER — HOSPITAL ENCOUNTER (EMERGENCY)
Facility: HOSPITAL | Age: 36
Discharge: HOME | End: 2021-07-04
Attending: EMERGENCY MEDICINE
Payer: COMMERCIAL

## 2021-07-04 VITALS
HEIGHT: 71 IN | RESPIRATION RATE: 15 BRPM | SYSTOLIC BLOOD PRESSURE: 121 MMHG | TEMPERATURE: 99 F | DIASTOLIC BLOOD PRESSURE: 73 MMHG | OXYGEN SATURATION: 98 % | WEIGHT: 160 LBS | BODY MASS INDEX: 22.4 KG/M2 | HEART RATE: 94 BPM

## 2021-07-04 DIAGNOSIS — R10.13 DYSPEPSIA: Primary | ICD-10-CM

## 2021-07-04 LAB
ALBUMIN SERPL-MCNC: 3.8 G/DL (ref 3.4–5)
ALP LIVER SERPL-CCNC: 95 U/L
ALT SERPL-CCNC: 57 U/L
ANION GAP SERPL CALC-SCNC: 6 MMOL/L (ref 0–18)
AST SERPL-CCNC: 34 U/L (ref 15–37)
BASOPHILS # BLD AUTO: 0.03 X10(3) UL (ref 0–0.2)
BASOPHILS NFR BLD AUTO: 0.3 %
BILIRUB DIRECT SERPL-MCNC: 0.1 MG/DL (ref 0–0.2)
BILIRUB SERPL-MCNC: 0.6 MG/DL (ref 0.1–2)
BILIRUB UR QL: NEGATIVE
BUN BLD-MCNC: 19 MG/DL (ref 7–18)
BUN/CREAT SERPL: 23.8 (ref 10–20)
CALCIUM BLD-MCNC: 8.7 MG/DL (ref 8.5–10.1)
CHLORIDE SERPL-SCNC: 107 MMOL/L (ref 98–112)
CLARITY UR: CLEAR
CO2 SERPL-SCNC: 27 MMOL/L (ref 21–32)
COLOR UR: YELLOW
CREAT BLD-MCNC: 0.8 MG/DL
DEPRECATED RDW RBC AUTO: 37.9 FL (ref 35.1–46.3)
EOSINOPHIL # BLD AUTO: 0.02 X10(3) UL (ref 0–0.7)
EOSINOPHIL NFR BLD AUTO: 0.2 %
ERYTHROCYTE [DISTWIDTH] IN BLOOD BY AUTOMATED COUNT: 11.9 % (ref 11–15)
GLUCOSE BLD-MCNC: 128 MG/DL (ref 70–99)
GLUCOSE UR-MCNC: NEGATIVE MG/DL
HCT VFR BLD AUTO: 45.3 %
HGB BLD-MCNC: 15.4 G/DL
HGB UR QL STRIP.AUTO: NEGATIVE
IMM GRANULOCYTES # BLD AUTO: 0.04 X10(3) UL (ref 0–1)
IMM GRANULOCYTES NFR BLD: 0.4 %
KETONES UR-MCNC: NEGATIVE MG/DL
LEUKOCYTE ESTERASE UR QL STRIP.AUTO: NEGATIVE
LIPASE SERPL-CCNC: 178 U/L (ref 73–393)
LYMPHOCYTES # BLD AUTO: 0.31 X10(3) UL (ref 1–4)
LYMPHOCYTES NFR BLD AUTO: 2.8 %
M PROTEIN MFR SERPL ELPH: 7.2 G/DL (ref 6.4–8.2)
MCH RBC QN AUTO: 29.4 PG (ref 26–34)
MCHC RBC AUTO-ENTMCNC: 34 G/DL (ref 31–37)
MCV RBC AUTO: 86.5 FL
MONOCYTES # BLD AUTO: 0.46 X10(3) UL (ref 0.1–1)
MONOCYTES NFR BLD AUTO: 4.2 %
NEUTROPHILS # BLD AUTO: 10.15 X10 (3) UL (ref 1.5–7.7)
NEUTROPHILS # BLD AUTO: 10.15 X10(3) UL (ref 1.5–7.7)
NEUTROPHILS NFR BLD AUTO: 92.1 %
NITRITE UR QL STRIP.AUTO: NEGATIVE
OSMOLALITY SERPL CALC.SUM OF ELEC: 294 MOSM/KG (ref 275–295)
PH UR: 6 [PH] (ref 5–8)
PLATELET # BLD AUTO: 212 10(3)UL (ref 150–450)
POTASSIUM SERPL-SCNC: 4.3 MMOL/L (ref 3.5–5.1)
PROT UR-MCNC: NEGATIVE MG/DL
RBC # BLD AUTO: 5.24 X10(6)UL
SODIUM SERPL-SCNC: 140 MMOL/L (ref 136–145)
SP GR UR STRIP: 1.03 (ref 1–1.03)
UROBILINOGEN UR STRIP-ACNC: <2
WBC # BLD AUTO: 11 X10(3) UL (ref 4–11)

## 2021-07-04 PROCEDURE — 85025 COMPLETE CBC W/AUTO DIFF WBC: CPT | Performed by: EMERGENCY MEDICINE

## 2021-07-04 PROCEDURE — 81003 URINALYSIS AUTO W/O SCOPE: CPT | Performed by: EMERGENCY MEDICINE

## 2021-07-04 PROCEDURE — 96374 THER/PROPH/DIAG INJ IV PUSH: CPT

## 2021-07-04 PROCEDURE — 83690 ASSAY OF LIPASE: CPT | Performed by: EMERGENCY MEDICINE

## 2021-07-04 PROCEDURE — 80076 HEPATIC FUNCTION PANEL: CPT | Performed by: EMERGENCY MEDICINE

## 2021-07-04 PROCEDURE — 99284 EMERGENCY DEPT VISIT MOD MDM: CPT

## 2021-07-04 PROCEDURE — 96375 TX/PRO/DX INJ NEW DRUG ADDON: CPT

## 2021-07-04 PROCEDURE — 80048 BASIC METABOLIC PNL TOTAL CA: CPT | Performed by: EMERGENCY MEDICINE

## 2021-07-04 RX ORDER — KETOROLAC TROMETHAMINE 15 MG/ML
15 INJECTION, SOLUTION INTRAMUSCULAR; INTRAVENOUS ONCE
Status: COMPLETED | OUTPATIENT
Start: 2021-07-04 | End: 2021-07-04

## 2021-07-04 RX ORDER — FAMOTIDINE 20 MG/1
20 TABLET ORAL DAILY
Qty: 7 TABLET | Refills: 0 | Status: SHIPPED | OUTPATIENT
Start: 2021-07-04 | End: 2021-07-11

## 2021-07-04 RX ORDER — ONDANSETRON 2 MG/ML
4 INJECTION INTRAMUSCULAR; INTRAVENOUS ONCE
Status: COMPLETED | OUTPATIENT
Start: 2021-07-04 | End: 2021-07-04

## 2021-07-04 NOTE — ED PROVIDER NOTES
Patient Seen in: [facility] Emergency Department      History   Patient presents with:  Abdomen/Flank Pain  Nausea/Vomiting/Diarrhea    Stated Complaint: n/v/d, abd pain    HPI/Subjective:   HPI    39 y/o male sober for 1 month here w/ 4 hrs of N/ vitals reviewed. Differential diagnosis includes alcoholic hepatitis, pancreatitis, dyspepsia, biliary colic, cholecystitis.       ED Course     Labs Reviewed   BASIC METABOLIC PANEL (8) - Abnormal; Notable for the following components:       Result Valu Prescribed:  Current Discharge Medication List    START taking these medications    famoTIDine (PEPCID) 20 MG Oral Tab  Take 1 tablet (20 mg total) by mouth daily for 7 days.   Qty: 7 tablet Refills: 0

## 2021-07-04 NOTE — ED INITIAL ASSESSMENT (HPI)
Pt report RUQ abdominal pain, vomiting, and diarrhea x4 hours. Pt states he has a history of pancreatitis. His last alcoholic drink was 30 days ago.

## 2021-09-09 ENCOUNTER — OFFICE VISIT (OUTPATIENT)
Dept: INTERNAL MEDICINE CLINIC | Facility: CLINIC | Age: 36
End: 2021-09-09
Payer: COMMERCIAL

## 2021-09-09 VITALS
DIASTOLIC BLOOD PRESSURE: 72 MMHG | HEART RATE: 72 BPM | SYSTOLIC BLOOD PRESSURE: 126 MMHG | BODY MASS INDEX: 25.9 KG/M2 | RESPIRATION RATE: 17 BRPM | OXYGEN SATURATION: 99 % | TEMPERATURE: 98 F | HEIGHT: 71 IN | WEIGHT: 185 LBS

## 2021-09-09 DIAGNOSIS — F41.9 ANXIETY: ICD-10-CM

## 2021-09-09 DIAGNOSIS — F10.11 HISTORY OF ALCOHOL ABUSE: ICD-10-CM

## 2021-09-09 DIAGNOSIS — F98.8 ATTENTION DEFICIT DISORDER, UNSPECIFIED HYPERACTIVITY PRESENCE: ICD-10-CM

## 2021-09-09 DIAGNOSIS — M25.511 ACUTE PAIN OF RIGHT SHOULDER: Primary | ICD-10-CM

## 2021-09-09 PROCEDURE — 3074F SYST BP LT 130 MM HG: CPT | Performed by: INTERNAL MEDICINE

## 2021-09-09 PROCEDURE — 99214 OFFICE O/P EST MOD 30 MIN: CPT | Performed by: INTERNAL MEDICINE

## 2021-09-09 PROCEDURE — 3008F BODY MASS INDEX DOCD: CPT | Performed by: INTERNAL MEDICINE

## 2021-09-09 PROCEDURE — 3078F DIAST BP <80 MM HG: CPT | Performed by: INTERNAL MEDICINE

## 2021-09-09 RX ORDER — SACCHAROMYCES BOULARDII 250 MG
CAPSULE ORAL
COMMUNITY
Start: 2021-06-21

## 2021-09-09 RX ORDER — ATOMOXETINE 60 MG/1
CAPSULE ORAL
COMMUNITY
Start: 2021-08-23

## 2021-09-09 RX ORDER — OXCARBAZEPINE 300 MG/1
TABLET, FILM COATED ORAL
COMMUNITY
Start: 2021-08-23

## 2021-09-09 RX ORDER — HYDROXYZINE PAMOATE 50 MG/1
CAPSULE ORAL
COMMUNITY
Start: 2021-06-17

## 2021-09-09 RX ORDER — GABAPENTIN 600 MG/1
TABLET ORAL
COMMUNITY
Start: 2021-08-27

## 2021-09-09 RX ORDER — ACAMPROSATE CALCIUM 333 MG/1
TABLET, DELAYED RELEASE ORAL
COMMUNITY
Start: 2021-07-09
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
## 2021-10-16 ENCOUNTER — OFFICE VISIT (OUTPATIENT)
Dept: INTERNAL MEDICINE CLINIC | Facility: CLINIC | Age: 36
End: 2021-10-16
Payer: COMMERCIAL

## 2021-10-16 VITALS
WEIGHT: 186 LBS | TEMPERATURE: 98 F | HEIGHT: 71 IN | HEART RATE: 76 BPM | RESPIRATION RATE: 18 BRPM | BODY MASS INDEX: 26.04 KG/M2 | SYSTOLIC BLOOD PRESSURE: 122 MMHG | OXYGEN SATURATION: 99 % | DIASTOLIC BLOOD PRESSURE: 78 MMHG

## 2021-10-16 DIAGNOSIS — F32.A DEPRESSION, UNSPECIFIED DEPRESSION TYPE: ICD-10-CM

## 2021-10-16 DIAGNOSIS — F41.1 GENERALIZED ANXIETY DISORDER: ICD-10-CM

## 2021-10-16 DIAGNOSIS — Z00.00 ANNUAL PHYSICAL EXAM: Primary | ICD-10-CM

## 2021-10-16 DIAGNOSIS — F98.8 ATTENTION DEFICIT DISORDER, UNSPECIFIED HYPERACTIVITY PRESENCE: ICD-10-CM

## 2021-10-16 DIAGNOSIS — Z80.42 FAMILY HISTORY OF PROSTATE CANCER IN FATHER: ICD-10-CM

## 2021-10-16 DIAGNOSIS — Z87.891 HISTORY OF SMOKING: ICD-10-CM

## 2021-10-16 PROCEDURE — 3078F DIAST BP <80 MM HG: CPT | Performed by: INTERNAL MEDICINE

## 2021-10-16 PROCEDURE — 3074F SYST BP LT 130 MM HG: CPT | Performed by: INTERNAL MEDICINE

## 2021-10-16 PROCEDURE — 99395 PREV VISIT EST AGE 18-39: CPT | Performed by: INTERNAL MEDICINE

## 2021-10-16 PROCEDURE — 3008F BODY MASS INDEX DOCD: CPT | Performed by: INTERNAL MEDICINE

## 2021-10-16 NOTE — PROGRESS NOTES
Subjective:     Patient ID: Arianna Rock is a 38year old male. HPI  Patient comes in for annual physical overall doing okay denies any complaints he has not been drinking since she only summer.   Follows with psych doing well,     History/Other: History: Social History    Tobacco Use      Smoking status: Former Smoker        Quit date:         Years since quittin.8      Smokeless tobacco: Never Used    Vaping Use      Vaping Use: Never used    Alcohol use: Yes      Comment: weekends     With Platelet      TSH W Reflex To Free T4      Lipid Panel      Urinalysis, Routine      Meds This Visit:  Requested Prescriptions      No prescriptions requested or ordered in this encounter       Imaging & Referrals:  [facility]

## 2021-11-01 ENCOUNTER — LAB ENCOUNTER (OUTPATIENT)
Dept: LAB | Facility: HOSPITAL | Age: 36
End: 2021-11-01
Attending: PHYSICAL MEDICINE & REHABILITATION
Payer: COMMERCIAL

## 2021-11-01 ENCOUNTER — TELEPHONE (OUTPATIENT)
Dept: PHYSICAL MEDICINE AND REHAB | Facility: CLINIC | Age: 36
End: 2021-11-01

## 2021-11-01 ENCOUNTER — HOSPITAL ENCOUNTER (OUTPATIENT)
Dept: GENERAL RADIOLOGY | Facility: HOSPITAL | Age: 36
Discharge: HOME | End: 2021-11-01
Attending: PHYSICAL MEDICINE & REHABILITATION
Payer: COMMERCIAL

## 2021-11-01 DIAGNOSIS — M75.41 SUBACROMIAL IMPINGEMENT OF RIGHT SHOULDER: ICD-10-CM

## 2021-11-01 DIAGNOSIS — F98.8 ATTENTION DEFICIT DISORDER, UNSPECIFIED HYPERACTIVITY PRESENCE: ICD-10-CM

## 2021-11-01 DIAGNOSIS — M75.21 BICEPS TENDONITIS ON RIGHT: ICD-10-CM

## 2021-11-01 DIAGNOSIS — Z00.00 ANNUAL PHYSICAL EXAM: ICD-10-CM

## 2021-11-01 DIAGNOSIS — M75.51 SUBACROMIAL BURSITIS OF RIGHT SHOULDER JOINT: ICD-10-CM

## 2021-11-01 DIAGNOSIS — Z80.42 FAMILY HISTORY OF PROSTATE CANCER IN FATHER: ICD-10-CM

## 2021-11-01 DIAGNOSIS — F41.1 GENERALIZED ANXIETY DISORDER: ICD-10-CM

## 2021-11-01 DIAGNOSIS — M25.511 ACUTE PAIN OF RIGHT SHOULDER: ICD-10-CM

## 2021-11-01 DIAGNOSIS — F41.9 ANXIETY: ICD-10-CM

## 2021-11-01 DIAGNOSIS — F32.A DEPRESSION, UNSPECIFIED DEPRESSION TYPE: ICD-10-CM

## 2021-11-01 DIAGNOSIS — Z87.891 HISTORY OF SMOKING: ICD-10-CM

## 2021-11-01 DIAGNOSIS — M67.919 TENDINOPATHY OF ROTATOR CUFF, UNSPECIFIED LATERALITY: ICD-10-CM

## 2021-11-01 DIAGNOSIS — R82.90 ABNORMAL URINE: Primary | ICD-10-CM

## 2021-11-01 PROCEDURE — 73030 X-RAY EXAM OF SHOULDER: CPT | Performed by: PHYSICAL MEDICINE & REHABILITATION

## 2021-11-01 PROCEDURE — 84443 ASSAY THYROID STIM HORMONE: CPT

## 2021-11-01 PROCEDURE — 85025 COMPLETE CBC W/AUTO DIFF WBC: CPT

## 2021-11-01 PROCEDURE — 80053 COMPREHEN METABOLIC PANEL: CPT

## 2021-11-01 PROCEDURE — 36415 COLL VENOUS BLD VENIPUNCTURE: CPT

## 2021-11-01 PROCEDURE — 81001 URINALYSIS AUTO W/SCOPE: CPT

## 2021-11-01 PROCEDURE — 80061 LIPID PANEL: CPT

## 2021-11-01 NOTE — TELEPHONE ENCOUNTER
Initiated authorization for Right subacromial CSI CPT 23682+ with [employer]  Coverage is active Transaction ID: [id number]    Status: Approved-authorization is not required per health plan

## 2021-11-01 NOTE — H&P
[address] H&P    Requesting Physician: Zeyad Kelly MD    Chief Complaint (Reason for Visit):  Patient presents with:  New Patient: Noé Prey left handed patient comes in today with Right shoulder pain.  p therapies, injections, or imaging. He denies paresthesias or incontinence. He does have some weakness int he right arm compared to previously. He works as an immigration and . PAST MEDICAL HISTORY:   History reviewed.  No pertinent past Negative. Genitourinary: Negative. Musculoskeletal: As per HPI   Skin: Negative. Neurological: As per HPI  Endo/Heme/Allergies: Negative. Psychiatric/Behavioral: Negative. All other systems reviewed and are negative.  Pertinent positives an to left  Push Off Test: Positive on the right for weakness and pain compared to the left  O'Briens: Negative    Gait:  Normal    Data  Admission on 07/04/2021, Discharged on 07/04/2021   Component Date Value Ref Range Status   • Glucose 07/04/2021 128* 70 Negative  Negative Final   • Microscopic 07/04/2021 Microscopic not indicated   Final   • WBC 07/04/2021 11.0  4.0 - 11.0 x10(3) uL Final   • RBC 07/04/2021 5.24  4.30 - 5.70 x10(6)uL Final   • HGB 07/04/2021 15.4  13.0 - 17.5 g/dL Final   • HCT 07/04/2021 physical therapy, diclofenac twice a day. I am also recommending use Tylenol as needed for pain. If he does not improve over the next couple of weeks, I would recommend a subacromial injection.   I will follow up with David Harry in about 6 weeks if he is impro

## 2021-11-01 NOTE — PATIENT INSTRUCTIONS
1) Get XR of the right shoulder today on your way out  2) Begin formal physical therapy at the [facility]  3) Take Diclofenac 75 mg 1 tablet twice per day with food for the next two weeks and then as needed but no more than 2 tablets per day.  Do not

## 2021-12-15 ENCOUNTER — TELEPHONE (OUTPATIENT)
Dept: PHYSICAL THERAPY | Facility: HOSPITAL | Age: 36
End: 2021-12-15

## 2021-12-21 ENCOUNTER — OFFICE VISIT (OUTPATIENT)
Dept: PHYSICAL THERAPY | Age: 36
End: 2021-12-21
Attending: PHYSICAL MEDICINE & REHABILITATION
Payer: COMMERCIAL

## 2021-12-21 DIAGNOSIS — M67.919 TENDINOPATHY OF ROTATOR CUFF, UNSPECIFIED LATERALITY: ICD-10-CM

## 2021-12-21 DIAGNOSIS — F98.8 ATTENTION DEFICIT DISORDER WITHOUT HYPERACTIVITY: ICD-10-CM

## 2021-12-21 DIAGNOSIS — M75.51 SUBACROMIAL BURSITIS OF RIGHT SHOULDER JOINT: ICD-10-CM

## 2021-12-21 DIAGNOSIS — M75.41 SUBACROMIAL IMPINGEMENT OF RIGHT SHOULDER: ICD-10-CM

## 2021-12-21 DIAGNOSIS — M25.511 ACUTE PAIN OF RIGHT SHOULDER: ICD-10-CM

## 2021-12-21 DIAGNOSIS — M75.21 BICEPS TENDINITIS ON RIGHT: ICD-10-CM

## 2021-12-21 DIAGNOSIS — F41.9 ANXIETY: ICD-10-CM

## 2021-12-21 PROCEDURE — 97110 THERAPEUTIC EXERCISES: CPT

## 2021-12-21 PROCEDURE — 97161 PT EVAL LOW COMPLEX 20 MIN: CPT
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
## 2021-12-28 ENCOUNTER — APPOINTMENT (OUTPATIENT)
Dept: PHYSICAL THERAPY | Age: 36
End: 2021-12-28
Attending: PHYSICAL MEDICINE & REHABILITATION
Payer: COMMERCIAL

## 2021-12-30 ENCOUNTER — APPOINTMENT (OUTPATIENT)
Dept: PHYSICAL THERAPY | Age: 36
End: 2021-12-30
Attending: PHYSICAL MEDICINE & REHABILITATION
Payer: COMMERCIAL

## 2022-01-04 ENCOUNTER — APPOINTMENT (OUTPATIENT)
Dept: PHYSICAL THERAPY | Age: 37
End: 2022-01-04
Attending: PHYSICAL MEDICINE & REHABILITATION
Payer: COMMERCIAL

## 2022-01-06 ENCOUNTER — OFFICE VISIT (OUTPATIENT)
Dept: PHYSICAL THERAPY | Age: 37
End: 2022-01-06
Attending: PHYSICAL MEDICINE & REHABILITATION
Payer: COMMERCIAL

## 2022-01-06 DIAGNOSIS — M25.511 ACUTE PAIN OF RIGHT SHOULDER: ICD-10-CM

## 2022-01-06 DIAGNOSIS — F41.9 ANXIETY: ICD-10-CM

## 2022-01-06 DIAGNOSIS — F98.8 ATTENTION DEFICIT DISORDER, UNSPECIFIED HYPERACTIVITY PRESENCE: ICD-10-CM

## 2022-01-06 DIAGNOSIS — M67.919 TENDINOPATHY OF ROTATOR CUFF, UNSPECIFIED LATERALITY: ICD-10-CM

## 2022-01-06 DIAGNOSIS — M75.51 SUBACROMIAL BURSITIS OF RIGHT SHOULDER JOINT: ICD-10-CM

## 2022-01-06 DIAGNOSIS — M75.41 SUBACROMIAL IMPINGEMENT OF RIGHT SHOULDER: ICD-10-CM

## 2022-01-06 DIAGNOSIS — M75.21 BICEPS TENDONITIS ON RIGHT: ICD-10-CM

## 2022-01-06 DIAGNOSIS — M75.21 BICEPS TENDINITIS ON RIGHT: ICD-10-CM

## 2022-01-06 DIAGNOSIS — F98.8 ATTENTION DEFICIT DISORDER WITHOUT HYPERACTIVITY: ICD-10-CM

## 2022-01-06 PROCEDURE — 97110 THERAPEUTIC EXERCISES: CPT

## 2022-01-06 PROCEDURE — 97140 MANUAL THERAPY 1/> REGIONS: CPT

## 2022-01-06 RX ORDER — DICLOFENAC SODIUM 75 MG/1
75 TABLET, DELAYED RELEASE ORAL 2 TIMES DAILY
Qty: 60 TABLET | Refills: 1 | Status: SHIPPED | OUTPATIENT
Start: 2022-01-06

## 2022-01-06 NOTE — PROGRESS NOTES
Dx: RTC dysfunction         Insurance (Authorized # of Visits):  Richie hKan PPO (8)           Authorizing Physician: Dr. Melissa Moulton  Next MD visit: none scheduled  Fall Risk: standard         Precautions: n/a             Subjective:  Went to orange theory workout clas - x5 mins         Therex:  Ext rot GTB 2x30  Mid trap GTB 2x30  Int rot GTB 2x30  -fatigue reported end of session                       HEP: ext rot, int rot, mid trap GTB    Charges: 2 manual, 1 therex       Total Timed Treatment: 45 min  Total Treatment

## 2022-01-06 NOTE — TELEPHONE ENCOUNTER
Medication request: diclofenac 75 MG Oral Tab EC  Sig:   Take 1 tablet (75 mg total) by mouth 2 (two) times daily. Take with food for 2 weeks as directed and then as needed.     LOV:11/1/2021  HBU:3/6/3920    [place]12/4/2021  Q-60 R- 0

## 2022-01-11 ENCOUNTER — OFFICE VISIT (OUTPATIENT)
Dept: PHYSICAL THERAPY | Age: 37
End: 2022-01-11
Attending: PHYSICAL MEDICINE & REHABILITATION
Payer: COMMERCIAL

## 2022-01-11 DIAGNOSIS — M67.919 TENDINOPATHY OF ROTATOR CUFF, UNSPECIFIED LATERALITY: ICD-10-CM

## 2022-01-11 DIAGNOSIS — M75.21 BICEPS TENDINITIS ON RIGHT: ICD-10-CM

## 2022-01-11 DIAGNOSIS — M25.511 ACUTE PAIN OF RIGHT SHOULDER: ICD-10-CM

## 2022-01-11 DIAGNOSIS — F98.8 ATTENTION DEFICIT DISORDER WITHOUT HYPERACTIVITY: ICD-10-CM

## 2022-01-11 DIAGNOSIS — F41.9 ANXIETY: ICD-10-CM

## 2022-01-11 DIAGNOSIS — M75.51 SUBACROMIAL BURSITIS OF RIGHT SHOULDER JOINT: ICD-10-CM

## 2022-01-11 DIAGNOSIS — M75.41 SUBACROMIAL IMPINGEMENT OF RIGHT SHOULDER: ICD-10-CM

## 2022-01-11 PROCEDURE — 97110 THERAPEUTIC EXERCISES: CPT

## 2022-01-11 PROCEDURE — 97140 MANUAL THERAPY 1/> REGIONS: CPT

## 2022-01-11 NOTE — PROGRESS NOTES
Dx: RTC dysfunction         Insurance (Authorized # of Visits):  Bob BURGESS (8)           Authorizing Physician: Dr. Trenna Barthel  Next MD visit: none scheduled  Fall Risk: standard         Precautions: n/a             Subjective: Shoulder has been a little better, AP bent elbow [id number]  Body blade lat/med elbow straight <90 deg flexion [id number]  Quadruped yoga ball wall rhythmic stabilization [id number]  Ext rot/int rot GTB x30 - just shy of 90/90                      HEP: ext rot (90/90), int rot (90/90), mid trap GTB

## 2022-01-13 ENCOUNTER — OFFICE VISIT (OUTPATIENT)
Dept: PHYSICAL THERAPY | Age: 37
End: 2022-01-13
Attending: PHYSICAL MEDICINE & REHABILITATION
Payer: COMMERCIAL

## 2022-01-13 DIAGNOSIS — M25.511 ACUTE PAIN OF RIGHT SHOULDER: ICD-10-CM

## 2022-01-13 DIAGNOSIS — M75.41 SUBACROMIAL IMPINGEMENT OF RIGHT SHOULDER: ICD-10-CM

## 2022-01-13 DIAGNOSIS — F98.8 ATTENTION DEFICIT DISORDER WITHOUT HYPERACTIVITY: ICD-10-CM

## 2022-01-13 DIAGNOSIS — M75.21 BICEPS TENDINITIS ON RIGHT: ICD-10-CM

## 2022-01-13 DIAGNOSIS — M67.919 TENDINOPATHY OF ROTATOR CUFF, UNSPECIFIED LATERALITY: ICD-10-CM

## 2022-01-13 DIAGNOSIS — F41.9 ANXIETY: ICD-10-CM

## 2022-01-13 DIAGNOSIS — M75.51 SUBACROMIAL BURSITIS OF RIGHT SHOULDER JOINT: ICD-10-CM

## 2022-01-13 PROCEDURE — 97140 MANUAL THERAPY 1/> REGIONS: CPT

## 2022-01-13 PROCEDURE — 97110 THERAPEUTIC EXERCISES: CPT

## 2022-01-13 NOTE — PROGRESS NOTES
Dx: RTC dysfunction         Insurance (Authorized # of Visits):  Gail Mitchell PPO (8)           Authorizing Physician: Dr. Rom Reese  Next MD visit: none scheduled  Fall Risk: standard         Precautions: n/a             Subjective: Still has consistent ache in shoul GTB 2x30  Int rot GTB 2x30  -fatigue reported end of session   Therex:   Body blade AP bent elbow [id number]  Body blade lat/med elbow straight <90 deg flexion [id number]  Quadruped yoga ball wall rhythmic stabilization [id number]  Ext rot/int rot GTB x30 - just shy

## 2022-01-17 ENCOUNTER — APPOINTMENT (OUTPATIENT)
Dept: PHYSICAL THERAPY | Age: 37
End: 2022-01-17
Payer: COMMERCIAL

## 2022-01-18 ENCOUNTER — APPOINTMENT (OUTPATIENT)
Dept: PHYSICAL THERAPY | Age: 37
End: 2022-01-18
Attending: PHYSICAL MEDICINE & REHABILITATION
Payer: COMMERCIAL

## 2022-01-24 ENCOUNTER — OFFICE VISIT (OUTPATIENT)
Dept: PHYSICAL THERAPY | Age: 37
End: 2022-01-24
Attending: PHYSICAL MEDICINE & REHABILITATION
Payer: COMMERCIAL

## 2022-01-24 PROCEDURE — 97140 MANUAL THERAPY 1/> REGIONS: CPT

## 2022-01-24 PROCEDURE — 97110 THERAPEUTIC EXERCISES: CPT

## 2022-01-24 NOTE — PROGRESS NOTES
Dx: RTC dysfunction         Insurance (Authorized # of Visits):  [facility] PPO (8)           Authorizing Physician: Dr. Nubia Tillman  Next MD visit: none scheduled  Fall Risk: standard         Precautions: n/a             Subjective: Did orange theory this morning and GTB 2x30  Mid trap GTB 2x30  Int rot GTB 2x30  -fatigue reported end of session   Therex:   Body blade AP bent elbow [id number]  Body blade lat/med elbow straight <90 deg flexion [id number]  Quadruped yoga ball wall rhythmic stabilization [id number]  Ext rot/int rot

## 2022-01-28 ENCOUNTER — OFFICE VISIT (OUTPATIENT)
Dept: PHYSICAL THERAPY | Age: 37
End: 2022-01-28
Attending: PHYSICAL MEDICINE & REHABILITATION
Payer: COMMERCIAL

## 2022-01-28 PROCEDURE — 97110 THERAPEUTIC EXERCISES: CPT

## 2022-01-28 PROCEDURE — 97140 MANUAL THERAPY 1/> REGIONS: CPT

## 2022-01-28 NOTE — PROGRESS NOTES
Dx: RTC dysfunction         Insurance (Authorized # of Visits):  Janes BURGESS (8)           Authorizing Physician: Dr. Norma Bower  Next MD visit: none scheduled  Fall Risk: standard         Precautions: n/a             Subjective: Not noticing pain as much with wor no pain with GH flexion Manual:  DN deltoid and infraspinatus x10 mins Manual:  DN deltoid, lat, and infraspinatus x10 mins   Therex:  Ext rot GTB 2x30  Mid trap GTB 2x30  Int rot GTB 2x30  -fatigue reported end of session   Therex:   Body blade AP bent elb

## 2022-02-07 ENCOUNTER — TELEPHONE (OUTPATIENT)
Dept: NEUROLOGY | Facility: CLINIC | Age: 37
End: 2022-02-07

## 2022-02-07 NOTE — PATIENT INSTRUCTIONS
Post Injection Instructions     1. Please do not do anything strenuous over the next two days (if you had a knee injection do not walk more than 2 city blocks, do not attend any aerobic classes, do not run, no heavy lifting, no prolong standing). 2. You may resume your day to day activities after your injection. 3. You may experience some mild amount of swelling after the procedure. 4. Please ice your joint that was injected at least 5-6 times a day (15 minutes) for two days after (this will help prevent worsening pain that sometimes occurs after an injection). 5. Only take tylenol if needed for pain for the first few days. 6. Watch for signs of infection which include redness, warmth, worsening pain, fevers or chills. If you develop any of these signs call the office immediately at [phone]    Everyone responds differently to injections, but you can expect your peak effects a few weeks after your last injection. Alonso Perry. Jennifer Bain MD  Physical Medicine and Rehabilitation/Sports Medicine  [address] with physical therapy  Follow up with me in 6-8 weeks.  If limited improvement, then would recommend right GH injection

## 2022-02-07 NOTE — TELEPHONE ENCOUNTER
[employer] for authorization of approval for RIGHT subacromial CSI cpt codes 94556, [id number]. Authorization is not required. Jodi Julio Transaction ID: [id number]. Will  inform Nursing.

## 2022-03-01 RX ORDER — DICLOFENAC SODIUM 75 MG/1
75 TABLET, DELAYED RELEASE ORAL 2 TIMES DAILY
Qty: 60 TABLET | Refills: 1 | Status: SHIPPED | OUTPATIENT
Start: 2022-03-01

## 2022-03-31 ENCOUNTER — TELEPHONE (OUTPATIENT)
Dept: NEUROLOGY | Facility: CLINIC | Age: 37
End: 2022-03-31

## 2022-03-31 NOTE — TELEPHONE ENCOUNTER
[employer] for authorization of approval for MRI SHOULDER, RIGHT (CPT=73221)  Authorization # [id number] valid 03/31/2022 - 05/29/2022. Pt. Informed. Transferred call to scheduling for appt.

## 2022-05-20 ENCOUNTER — HOSPITAL ENCOUNTER (OUTPATIENT)
Dept: MRI IMAGING | Age: 37
Discharge: HOME | End: 2022-05-20
Attending: PHYSICAL MEDICINE & REHABILITATION
Payer: COMMERCIAL

## 2022-05-20 DIAGNOSIS — M75.51 SUBACROMIAL BURSITIS OF RIGHT SHOULDER JOINT: ICD-10-CM

## 2022-05-20 DIAGNOSIS — M67.919 TENDINOPATHY OF ROTATOR CUFF, UNSPECIFIED LATERALITY: ICD-10-CM

## 2022-05-20 DIAGNOSIS — M25.511 ACUTE PAIN OF RIGHT SHOULDER: ICD-10-CM

## 2022-05-20 DIAGNOSIS — M75.21 BICEPS TENDONITIS ON RIGHT: ICD-10-CM

## 2022-05-20 DIAGNOSIS — F98.8 ATTENTION DEFICIT DISORDER, UNSPECIFIED HYPERACTIVITY PRESENCE: ICD-10-CM

## 2022-05-20 DIAGNOSIS — F41.9 ANXIETY: ICD-10-CM

## 2022-05-20 DIAGNOSIS — M75.41 SUBACROMIAL IMPINGEMENT OF RIGHT SHOULDER: ICD-10-CM

## 2022-05-20 PROCEDURE — 73221 MRI JOINT UPR EXTREM W/O DYE: CPT | Performed by: PHYSICAL MEDICINE & REHABILITATION

## 2022-05-31 ENCOUNTER — HOSPITAL ENCOUNTER (EMERGENCY)
Facility: HOSPITAL | Age: 37
Discharge: HOME | End: 2022-05-31
Attending: EMERGENCY MEDICINE
Payer: COMMERCIAL

## 2022-05-31 ENCOUNTER — APPOINTMENT (OUTPATIENT)
Dept: GENERAL RADIOLOGY | Facility: HOSPITAL | Age: 37
End: 2022-05-31
Attending: EMERGENCY MEDICINE
Payer: COMMERCIAL

## 2022-05-31 VITALS
HEART RATE: 71 BPM | WEIGHT: 185 LBS | BODY MASS INDEX: 26 KG/M2 | TEMPERATURE: 98 F | RESPIRATION RATE: 17 BRPM | DIASTOLIC BLOOD PRESSURE: 70 MMHG | SYSTOLIC BLOOD PRESSURE: 124 MMHG | OXYGEN SATURATION: 100 %

## 2022-05-31 DIAGNOSIS — K05.10 GINGIVOSTOMATITIS: Primary | ICD-10-CM

## 2022-05-31 DIAGNOSIS — J02.0 STREP PHARYNGITIS: ICD-10-CM

## 2022-05-31 DIAGNOSIS — M79.10 MYALGIA: ICD-10-CM

## 2022-05-31 LAB
ANION GAP SERPL CALC-SCNC: 4 MMOL/L (ref 0–18)
BASOPHILS # BLD AUTO: 0.01 X10(3) UL (ref 0–0.2)
BASOPHILS NFR BLD AUTO: 0.3 %
BILIRUB UR QL: NEGATIVE
BUN BLD-MCNC: 12 MG/DL (ref 7–18)
BUN/CREAT SERPL: 11.9 (ref 10–20)
CALCIUM BLD-MCNC: 8.8 MG/DL (ref 8.5–10.1)
CHLORIDE SERPL-SCNC: 106 MMOL/L (ref 98–112)
CK SERPL-CCNC: 467 U/L
CLARITY UR: CLEAR
CO2 SERPL-SCNC: 29 MMOL/L (ref 21–32)
COLOR UR: YELLOW
CREAT BLD-MCNC: 1.01 MG/DL
CRP SERPL-MCNC: 0.41 MG/DL (ref ?–0.3)
DEPRECATED RDW RBC AUTO: 37.5 FL (ref 35.1–46.3)
EOSINOPHIL # BLD AUTO: 0.06 X10(3) UL (ref 0–0.7)
EOSINOPHIL NFR BLD AUTO: 1.5 %
ERYTHROCYTE [DISTWIDTH] IN BLOOD BY AUTOMATED COUNT: 11.8 % (ref 11–15)
ERYTHROCYTE [SEDIMENTATION RATE] IN BLOOD: 7 MM/HR
FLUAV + FLUBV RNA SPEC NAA+PROBE: NEGATIVE
FLUAV + FLUBV RNA SPEC NAA+PROBE: NEGATIVE
GLUCOSE BLD-MCNC: 90 MG/DL (ref 70–99)
GLUCOSE UR-MCNC: NEGATIVE MG/DL
HCT VFR BLD AUTO: 39.8 %
HGB BLD-MCNC: 13.5 G/DL
HGB UR QL STRIP.AUTO: NEGATIVE
IMM GRANULOCYTES # BLD AUTO: 0.01 X10(3) UL (ref 0–1)
IMM GRANULOCYTES NFR BLD: 0.3 %
LEUKOCYTE ESTERASE UR QL STRIP.AUTO: NEGATIVE
LYMPHOCYTES # BLD AUTO: 1.09 X10(3) UL (ref 1–4)
LYMPHOCYTES NFR BLD AUTO: 27.5 %
MCH RBC QN AUTO: 29.5 PG (ref 26–34)
MCHC RBC AUTO-ENTMCNC: 33.9 G/DL (ref 31–37)
MCV RBC AUTO: 86.9 FL
MONOCYTES # BLD AUTO: 0.53 X10(3) UL (ref 0.1–1)
MONOCYTES NFR BLD AUTO: 13.4 %
NEUTROPHILS # BLD AUTO: 2.26 X10 (3) UL (ref 1.5–7.7)
NEUTROPHILS # BLD AUTO: 2.26 X10(3) UL (ref 1.5–7.7)
NEUTROPHILS NFR BLD AUTO: 57 %
NITRITE UR QL STRIP.AUTO: NEGATIVE
OSMOLALITY SERPL CALC.SUM OF ELEC: 287 MOSM/KG (ref 275–295)
PH UR: 5 [PH] (ref 5–8)
PLATELET # BLD AUTO: 146 10(3)UL (ref 150–450)
POTASSIUM SERPL-SCNC: 4.2 MMOL/L (ref 3.5–5.1)
PROT UR-MCNC: NEGATIVE MG/DL
RBC # BLD AUTO: 4.58 X10(6)UL
RSV RNA SPEC NAA+PROBE: NEGATIVE
S PYO AG THROAT QL: POSITIVE
SARS-COV-2 RNA RESP QL NAA+PROBE: NOT DETECTED
SODIUM SERPL-SCNC: 139 MMOL/L (ref 136–145)
SP GR UR STRIP: 1.03 (ref 1–1.03)
UROBILINOGEN UR STRIP-ACNC: <2
VIT C UR-MCNC: 40 MG/DL
WBC # BLD AUTO: 4 X10(3) UL (ref 4–11)

## 2022-05-31 PROCEDURE — 80048 BASIC METABOLIC PNL TOTAL CA: CPT | Performed by: EMERGENCY MEDICINE

## 2022-05-31 PROCEDURE — 85652 RBC SED RATE AUTOMATED: CPT | Performed by: EMERGENCY MEDICINE

## 2022-05-31 PROCEDURE — 82550 ASSAY OF CK (CPK): CPT | Performed by: EMERGENCY MEDICINE

## 2022-05-31 PROCEDURE — 87880 STREP A ASSAY W/OPTIC: CPT

## 2022-05-31 PROCEDURE — 81001 URINALYSIS AUTO W/SCOPE: CPT | Performed by: EMERGENCY MEDICINE

## 2022-05-31 PROCEDURE — 86140 C-REACTIVE PROTEIN: CPT | Performed by: EMERGENCY MEDICINE

## 2022-05-31 PROCEDURE — 0241U SARS-COV-2/FLU A AND B/RSV BY PCR (GENEXPERT): CPT | Performed by: EMERGENCY MEDICINE

## 2022-05-31 PROCEDURE — 85025 COMPLETE CBC W/AUTO DIFF WBC: CPT | Performed by: EMERGENCY MEDICINE

## 2022-05-31 PROCEDURE — 96374 THER/PROPH/DIAG INJ IV PUSH: CPT

## 2022-05-31 PROCEDURE — 99284 EMERGENCY DEPT VISIT MOD MDM: CPT

## 2022-05-31 PROCEDURE — 71045 X-RAY EXAM CHEST 1 VIEW: CPT | Performed by: EMERGENCY MEDICINE

## 2022-05-31 PROCEDURE — 96361 HYDRATE IV INFUSION ADD-ON: CPT

## 2022-05-31 RX ORDER — NAPROXEN 500 MG/1
500 TABLET ORAL 2 TIMES DAILY PRN
Qty: 14 TABLET | Refills: 0 | Status: SHIPPED | OUTPATIENT
Start: 2022-05-31 | End: 2022-06-07

## 2022-05-31 RX ORDER — KETOROLAC TROMETHAMINE 15 MG/ML
15 INJECTION, SOLUTION INTRAMUSCULAR; INTRAVENOUS ONCE
Status: COMPLETED | OUTPATIENT
Start: 2022-05-31 | End: 2022-05-31

## 2022-05-31 RX ORDER — PENICILLIN V POTASSIUM 500 MG/1
500 TABLET ORAL 3 TIMES DAILY
Qty: 30 TABLET | Refills: 0 | Status: SHIPPED | OUTPATIENT
Start: 2022-05-31 | End: 2022-06-10

## 2022-05-31 RX ORDER — ORPHENADRINE CITRATE 100 MG/1
100 TABLET, EXTENDED RELEASE ORAL 2 TIMES DAILY
Qty: 14 EACH | Refills: 0 | Status: SHIPPED | OUTPATIENT
Start: 2022-05-31 | End: 2022-06-07

## 2022-05-31 RX ORDER — LIDOCAINE HYDROCHLORIDE 20 MG/ML
10 SOLUTION OROPHARYNGEAL ONCE
Status: COMPLETED | OUTPATIENT
Start: 2022-05-31 | End: 2022-05-31

## 2022-05-31 RX ORDER — MAGNESIUM HYDROXIDE/ALUMINUM HYDROXICE/SIMETHICONE 120; 1200; 1200 MG/30ML; MG/30ML; MG/30ML
15 SUSPENSION ORAL ONCE
Status: COMPLETED | OUTPATIENT
Start: 2022-05-31 | End: 2022-05-31

## 2022-05-31 NOTE — ED INITIAL ASSESSMENT (HPI)
Patient complains of body aches and mouth sores since Friday. Did not take tylenol or ibuprofen pta.

## 2022-06-02 ENCOUNTER — OFFICE VISIT (OUTPATIENT)
Dept: PHYSICAL MEDICINE AND REHAB | Facility: CLINIC | Age: 37
End: 2022-06-02
Payer: COMMERCIAL

## 2022-06-02 ENCOUNTER — TELEPHONE (OUTPATIENT)
Dept: PHYSICAL MEDICINE AND REHAB | Facility: CLINIC | Age: 37
End: 2022-06-02

## 2022-06-02 ENCOUNTER — TELEPHONE (OUTPATIENT)
Dept: INTERNAL MEDICINE CLINIC | Facility: CLINIC | Age: 37
End: 2022-06-02

## 2022-06-02 VITALS — HEIGHT: 71 IN | BODY MASS INDEX: 25.9 KG/M2 | HEART RATE: 72 BPM | WEIGHT: 185 LBS | OXYGEN SATURATION: 99 %

## 2022-06-02 DIAGNOSIS — M25.511 ACUTE PAIN OF RIGHT SHOULDER: Primary | ICD-10-CM

## 2022-06-02 DIAGNOSIS — M75.41 SUBACROMIAL IMPINGEMENT OF RIGHT SHOULDER: ICD-10-CM

## 2022-06-02 DIAGNOSIS — M75.21 BICEPS TENDONITIS ON RIGHT: ICD-10-CM

## 2022-06-02 DIAGNOSIS — M67.919 TENDINOPATHY OF ROTATOR CUFF, UNSPECIFIED LATERALITY: ICD-10-CM

## 2022-06-02 DIAGNOSIS — M19.011 OSTEOARTHRITIS OF RIGHT GLENOHUMERAL JOINT: ICD-10-CM

## 2022-06-02 DIAGNOSIS — M75.51 SUBACROMIAL BURSITIS OF RIGHT SHOULDER JOINT: ICD-10-CM

## 2022-06-02 PROCEDURE — 3008F BODY MASS INDEX DOCD: CPT | Performed by: PHYSICAL MEDICINE & REHABILITATION

## 2022-06-02 PROCEDURE — 99214 OFFICE O/P EST MOD 30 MIN: CPT | Performed by: PHYSICAL MEDICINE & REHABILITATION

## 2022-06-02 NOTE — TELEPHONE ENCOUNTER
Initiated authorization for Right [facility] CSI under ultrasound guidance CPT 47636+ with [employer] online  Coverage is active  Status: Approved-authorization is not required per health plan

## 2022-06-02 NOTE — PATIENT INSTRUCTIONS
1 My office will call you to schedule the RIGHT [facility] CSI under ultrasound guidance once the procedure is approved by your insurance carrier. Lets plan for 6/16 at 9:40 am  2) If no improvement, then would consider right biceps tendon sheath CSI under ultrasound guidance  3) If that is not helpful, the would recommend PRP depending on where your pain is vs surgically consultation.

## 2022-06-02 NOTE — TELEPHONE ENCOUNTER
Patient was just seen in ER. He scheduled an ER f/u for tomorrow.      Future Appointments   Date Time Provider Ananya Mela   6/2/2022  4:00 PM Phuc Meredith MD PM&R [facility]   6/3/2022  1:00 PM Christiano Lamas MD [id number] [id number]

## 2022-06-03 ENCOUNTER — OFFICE VISIT (OUTPATIENT)
Dept: INTERNAL MEDICINE CLINIC | Facility: CLINIC | Age: 37
End: 2022-06-03
Payer: COMMERCIAL

## 2022-06-03 VITALS
DIASTOLIC BLOOD PRESSURE: 76 MMHG | TEMPERATURE: 98 F | WEIGHT: 175 LBS | SYSTOLIC BLOOD PRESSURE: 126 MMHG | RESPIRATION RATE: 17 BRPM | BODY MASS INDEX: 24.5 KG/M2 | OXYGEN SATURATION: 98 % | HEIGHT: 71 IN | HEART RATE: 67 BPM

## 2022-06-03 DIAGNOSIS — Z87.09 HISTORY OF STREP SORE THROAT: ICD-10-CM

## 2022-06-03 DIAGNOSIS — R93.89 ABNORMAL CXR: ICD-10-CM

## 2022-06-03 DIAGNOSIS — Z82.49 FH: CAD (CORONARY ARTERY DISEASE): ICD-10-CM

## 2022-06-03 DIAGNOSIS — Z09 HOSPITAL DISCHARGE FOLLOW-UP: Primary | ICD-10-CM

## 2022-06-03 PROCEDURE — 3074F SYST BP LT 130 MM HG: CPT | Performed by: INTERNAL MEDICINE

## 2022-06-03 PROCEDURE — 3008F BODY MASS INDEX DOCD: CPT | Performed by: INTERNAL MEDICINE

## 2022-06-03 PROCEDURE — 99214 OFFICE O/P EST MOD 30 MIN: CPT | Performed by: INTERNAL MEDICINE

## 2022-06-03 PROCEDURE — 3078F DIAST BP <80 MM HG: CPT | Performed by: INTERNAL MEDICINE

## 2022-06-16 ENCOUNTER — OFFICE VISIT (OUTPATIENT)
Dept: PHYSICAL MEDICINE AND REHAB | Facility: CLINIC | Age: 37
End: 2022-06-16
Payer: COMMERCIAL

## 2022-06-16 DIAGNOSIS — M75.21 BICEPS TENDONITIS ON RIGHT: ICD-10-CM

## 2022-06-16 DIAGNOSIS — M75.51 SUBACROMIAL BURSITIS OF RIGHT SHOULDER JOINT: ICD-10-CM

## 2022-06-16 DIAGNOSIS — M67.919 TENDINOPATHY OF ROTATOR CUFF, UNSPECIFIED LATERALITY: ICD-10-CM

## 2022-06-16 DIAGNOSIS — F41.9 ANXIETY: ICD-10-CM

## 2022-06-16 DIAGNOSIS — M25.511 ACUTE PAIN OF RIGHT SHOULDER: ICD-10-CM

## 2022-06-16 DIAGNOSIS — F98.8 ATTENTION DEFICIT DISORDER, UNSPECIFIED HYPERACTIVITY PRESENCE: ICD-10-CM

## 2022-06-16 DIAGNOSIS — M19.011 OSTEOARTHRITIS OF RIGHT GLENOHUMERAL JOINT: ICD-10-CM

## 2022-06-16 DIAGNOSIS — M75.41 SUBACROMIAL IMPINGEMENT OF RIGHT SHOULDER: Primary | ICD-10-CM

## 2022-06-16 NOTE — PATIENT INSTRUCTIONS
Post Injection Instructions     1. Please do not do anything strenuous over the next two days (if you had a knee injection do not walk more than 2 city blocks, do not attend any aerobic classes, do not run, no heavy lifting, no prolong standing). 2. You may resume your day to day activities after your injection. 3. You may experience some mild amount of swelling after the procedure. 4. Please ice your joint that was injected at least 5-6 times a day (15 minutes) for two days after (this will help prevent worsening pain that sometimes occurs after an injection). 5. Only take tylenol if needed for pain for the first few days. 6. Watch for signs of infection which include redness, warmth, worsening pain, fevers or chills. If you develop any of these signs call the office immediately at [phone]    Everyone responds differently to injections, but you can expect your peak effects a few weeks after your last injection. Almas Rueda. Sylvia Galvan MD  Physical Medicine and Rehabilitation/Sports Medicine  [facility]    Follow up in 3-4wks.

## 2022-06-17 RX ORDER — TRIAMCINOLONE ACETONIDE 40 MG/ML
40 INJECTION, SUSPENSION INTRA-ARTICULAR; INTRAMUSCULAR ONCE
Status: COMPLETED | OUTPATIENT
Start: 2022-06-17 | End: 2022-06-17

## 2022-06-17 RX ORDER — LIDOCAINE HYDROCHLORIDE 10 MG/ML
9 INJECTION, SOLUTION INFILTRATION; PERINEURAL ONCE
Status: COMPLETED | OUTPATIENT
Start: 2022-06-17 | End: 2022-06-17

## 2022-06-17 RX ADMIN — LIDOCAINE HYDROCHLORIDE 9 ML: 10 INJECTION, SOLUTION INFILTRATION; PERINEURAL at 09:06:00

## 2022-06-17 RX ADMIN — TRIAMCINOLONE ACETONIDE 40 MG: 40 INJECTION, SUSPENSION INTRA-ARTICULAR; INTRAMUSCULAR at 09:06:00

## 2022-07-13 ENCOUNTER — OFFICE VISIT (OUTPATIENT)
Dept: PHYSICAL MEDICINE AND REHAB | Facility: CLINIC | Age: 37
End: 2022-07-13
Payer: COMMERCIAL

## 2022-07-13 ENCOUNTER — TELEPHONE (OUTPATIENT)
Dept: PHYSICAL MEDICINE AND REHAB | Facility: CLINIC | Age: 37
End: 2022-07-13

## 2022-07-13 VITALS
HEART RATE: 95 BPM | WEIGHT: 170.63 LBS | OXYGEN SATURATION: 99 % | BODY MASS INDEX: 23.89 KG/M2 | SYSTOLIC BLOOD PRESSURE: 128 MMHG | DIASTOLIC BLOOD PRESSURE: 72 MMHG | HEIGHT: 71 IN

## 2022-07-13 DIAGNOSIS — M67.919 TENDINOPATHY OF ROTATOR CUFF, UNSPECIFIED LATERALITY: ICD-10-CM

## 2022-07-13 DIAGNOSIS — M75.21 BICEPS TENDONITIS ON RIGHT: ICD-10-CM

## 2022-07-13 DIAGNOSIS — M25.511 ACUTE PAIN OF RIGHT SHOULDER: ICD-10-CM

## 2022-07-13 DIAGNOSIS — M75.41 SUBACROMIAL IMPINGEMENT OF RIGHT SHOULDER: Primary | ICD-10-CM

## 2022-07-13 PROCEDURE — 3074F SYST BP LT 130 MM HG: CPT | Performed by: PHYSICAL MEDICINE & REHABILITATION

## 2022-07-13 PROCEDURE — 3008F BODY MASS INDEX DOCD: CPT | Performed by: PHYSICAL MEDICINE & REHABILITATION

## 2022-07-13 PROCEDURE — 3078F DIAST BP <80 MM HG: CPT | Performed by: PHYSICAL MEDICINE & REHABILITATION

## 2022-07-13 PROCEDURE — 99214 OFFICE O/P EST MOD 30 MIN: CPT | Performed by: PHYSICAL MEDICINE & REHABILITATION

## 2022-07-13 NOTE — TELEPHONE ENCOUNTER
Initiated authorization for RIGHT subacromial CSI under ultrasound guidance CPT 51049+ with [employer] online  Coverage is active  Status: Approved-authorization is not required per health plan      AND    Initiated authorization for right biceps tendon sheath CSI under ultrasound guidance CPT [id number]++73822 with [employer] online  Status: Approved-authorization is not required per health plan

## 2022-07-13 NOTE — PATIENT INSTRUCTIONS
1 My office will call you to schedule the RIGHT subacromial CSI unde ultrasound guidance once the procedure is approved by your insurance carrier.     2) Follow up for injection on 7/27 at 11:40

## 2022-07-27 ENCOUNTER — OFFICE VISIT (OUTPATIENT)
Dept: PHYSICAL MEDICINE AND REHAB | Facility: CLINIC | Age: 37
End: 2022-07-27
Payer: COMMERCIAL

## 2022-07-27 DIAGNOSIS — M75.51 SUBACROMIAL BURSITIS OF RIGHT SHOULDER JOINT: ICD-10-CM

## 2022-07-27 DIAGNOSIS — M75.41 SUBACROMIAL IMPINGEMENT OF RIGHT SHOULDER: ICD-10-CM

## 2022-07-27 DIAGNOSIS — M25.511 ACUTE PAIN OF RIGHT SHOULDER: Primary | ICD-10-CM

## 2022-07-27 DIAGNOSIS — M75.21 BICEPS TENDONITIS ON RIGHT: ICD-10-CM

## 2022-07-27 DIAGNOSIS — M67.919 TENDINOPATHY OF ROTATOR CUFF, UNSPECIFIED LATERALITY: ICD-10-CM

## 2022-07-27 DIAGNOSIS — M19.011 OSTEOARTHRITIS OF RIGHT GLENOHUMERAL JOINT: ICD-10-CM

## 2022-07-27 PROCEDURE — 20611 DRAIN/INJ JOINT/BURSA W/US: CPT | Performed by: PHYSICAL MEDICINE & REHABILITATION

## 2022-07-27 RX ORDER — LIDOCAINE HYDROCHLORIDE 10 MG/ML
7 INJECTION, SOLUTION INFILTRATION; PERINEURAL ONCE
Status: COMPLETED | OUTPATIENT
Start: 2022-07-27 | End: 2022-07-27

## 2022-07-27 RX ORDER — TRIAMCINOLONE ACETONIDE 40 MG/ML
40 INJECTION, SUSPENSION INTRA-ARTICULAR; INTRAMUSCULAR ONCE
Status: COMPLETED | OUTPATIENT
Start: 2022-07-27 | End: 2022-07-27

## 2022-07-27 RX ADMIN — TRIAMCINOLONE ACETONIDE 40 MG: 40 INJECTION, SUSPENSION INTRA-ARTICULAR; INTRAMUSCULAR at 12:27:00

## 2022-07-27 RX ADMIN — LIDOCAINE HYDROCHLORIDE 7 ML: 10 INJECTION, SOLUTION INFILTRATION; PERINEURAL at 12:27:00

## 2022-07-27 NOTE — PATIENT INSTRUCTIONS
Post Injection Instructions     1. Please do not do anything strenuous over the next two days (if you had a knee injection do not walk more than 2 city blocks, do not attend any aerobic classes, do not run, no heavy lifting, no prolong standing). 2. You may resume your day to day activities after your injection. 3. You may experience some mild amount of swelling after the procedure. 4. Please ice your joint that was injected at least 5-6 times a day (15 minutes) for two days after (this will help prevent worsening pain that sometimes occurs after an injection). 5. Only take tylenol if needed for pain for the first few days. 6. Watch for signs of infection which include redness, warmth, worsening pain, fevers or chills. If you develop any of these signs call the office immediately at [phone]    Everyone responds differently to injections, but you can expect your peak effects a few weeks after your last injection. Trev Beauchamp.  Yessi Armando MD  Physical Medicine and Rehabilitation/Sports Medicine  MEDICAL CENTER [facility]

## 2022-09-09 ENCOUNTER — TELEMEDICINE (OUTPATIENT)
Dept: INTERNAL MEDICINE CLINIC | Facility: CLINIC | Age: 37
End: 2022-09-09

## 2022-09-09 DIAGNOSIS — R05.1 ACUTE COUGH: Primary | ICD-10-CM

## 2022-09-09 DIAGNOSIS — R51.9 ACUTE NONINTRACTABLE HEADACHE, UNSPECIFIED HEADACHE TYPE: ICD-10-CM

## 2022-09-09 DIAGNOSIS — J02.9 SORE THROAT: ICD-10-CM

## 2022-09-09 PROCEDURE — 99214 OFFICE O/P EST MOD 30 MIN: CPT | Performed by: INTERNAL MEDICINE

## 2022-10-05 ENCOUNTER — TELEPHONE (OUTPATIENT)
Dept: INTERNAL MEDICINE CLINIC | Facility: CLINIC | Age: 37
End: 2022-10-05

## 2022-10-05 RX ORDER — FINASTERIDE 1 MG/1
1 TABLET, FILM COATED ORAL DAILY
Qty: 90 TABLET | Refills: 1 | Status: SHIPPED | OUTPATIENT
Start: 2022-10-05

## 2022-10-05 NOTE — TELEPHONE ENCOUNTER
Patient states he spoke to Dr. Joaquin Bonds during appointment about receiving medication for hair loss.  Patient requesting medication    Tropezia, 5 mg

## 2022-10-05 NOTE — TELEPHONE ENCOUNTER
Contacted patient (name and  of patient verified). He confirmed it is Propecia to treat hair loss. Dr. Ciera Ngo, patient is requesting Propecia prescription. Thank you.

## 2022-10-05 NOTE — TELEPHONE ENCOUNTER
I do not know about such medication Tropezia? ?, maybe he means Propecia ( Finasteride) and its 1mg daily

## 2022-10-18 ENCOUNTER — OFFICE VISIT (OUTPATIENT)
Dept: INTERNAL MEDICINE CLINIC | Facility: CLINIC | Age: 37
End: 2022-10-18
Payer: COMMERCIAL

## 2022-10-18 VITALS
TEMPERATURE: 98 F | SYSTOLIC BLOOD PRESSURE: 116 MMHG | OXYGEN SATURATION: 98 % | RESPIRATION RATE: 17 BRPM | DIASTOLIC BLOOD PRESSURE: 72 MMHG | HEIGHT: 71 IN | BODY MASS INDEX: 23.52 KG/M2 | HEART RATE: 62 BPM | WEIGHT: 168 LBS

## 2022-10-18 DIAGNOSIS — F41.1 GENERALIZED ANXIETY DISORDER: ICD-10-CM

## 2022-10-18 DIAGNOSIS — Z82.49 FH: CAD (CORONARY ARTERY DISEASE): ICD-10-CM

## 2022-10-18 DIAGNOSIS — F98.8 ATTENTION DEFICIT DISORDER, UNSPECIFIED HYPERACTIVITY PRESENCE: ICD-10-CM

## 2022-10-18 DIAGNOSIS — F32.A DEPRESSION, UNSPECIFIED DEPRESSION TYPE: ICD-10-CM

## 2022-10-18 DIAGNOSIS — Z80.42 FAMILY HISTORY OF PROSTATE CANCER IN FATHER: ICD-10-CM

## 2022-10-18 DIAGNOSIS — Z80.0 FH: COLON CANCER: ICD-10-CM

## 2022-10-18 DIAGNOSIS — F41.9 ANXIETY: ICD-10-CM

## 2022-10-18 DIAGNOSIS — Z00.00 ANNUAL PHYSICAL EXAM: Primary | ICD-10-CM

## 2022-10-18 LAB
ALBUMIN SERPL-MCNC: 4.4 G/DL (ref 3.4–5)
ALBUMIN/GLOB SERPL: 1.4 {RATIO} (ref 1–2)
ALP LIVER SERPL-CCNC: 92 U/L
ALT SERPL-CCNC: 54 U/L
ANION GAP SERPL CALC-SCNC: 7 MMOL/L (ref 0–18)
AST SERPL-CCNC: 32 U/L (ref 15–37)
BASOPHILS # BLD AUTO: 0.06 X10(3) UL (ref 0–0.2)
BASOPHILS NFR BLD AUTO: 0.7 %
BILIRUB SERPL-MCNC: 0.7 MG/DL (ref 0.1–2)
BILIRUB UR QL: NEGATIVE
BUN BLD-MCNC: 17 MG/DL (ref 7–18)
BUN/CREAT SERPL: 13.6 (ref 10–20)
CALCIUM BLD-MCNC: 9.2 MG/DL (ref 8.5–10.1)
CHLORIDE SERPL-SCNC: 99 MMOL/L (ref 98–112)
CHOLEST SERPL-MCNC: 173 MG/DL (ref ?–200)
CO2 SERPL-SCNC: 28 MMOL/L (ref 21–32)
COLOR UR: YELLOW
COMPLEXED PSA SERPL-MCNC: 0.53 NG/ML (ref ?–4)
CREAT BLD-MCNC: 1.25 MG/DL
DEPRECATED RDW RBC AUTO: 38.4 FL (ref 35.1–46.3)
EOSINOPHIL # BLD AUTO: 0.13 X10(3) UL (ref 0–0.7)
EOSINOPHIL NFR BLD AUTO: 1.5 %
ERYTHROCYTE [DISTWIDTH] IN BLOOD BY AUTOMATED COUNT: 11.9 % (ref 11–15)
FASTING PATIENT LIPID ANSWER: YES
FASTING STATUS PATIENT QL REPORTED: YES
GFR SERPLBLD BASED ON 1.73 SQ M-ARVRAT: 76 ML/MIN/1.73M2 (ref 60–?)
GLOBULIN PLAS-MCNC: 3.2 G/DL (ref 2.8–4.4)
GLUCOSE BLD-MCNC: 80 MG/DL (ref 70–99)
GLUCOSE UR-MCNC: NEGATIVE MG/DL
HCT VFR BLD AUTO: 46.5 %
HDLC SERPL-MCNC: 68 MG/DL (ref 40–59)
HGB BLD-MCNC: 15.7 G/DL
HGB UR QL STRIP.AUTO: NEGATIVE
HYALINE CASTS #/AREA URNS AUTO: PRESENT /LPF
IMM GRANULOCYTES # BLD AUTO: 0.03 X10(3) UL (ref 0–1)
IMM GRANULOCYTES NFR BLD: 0.3 %
LDLC SERPL CALC-MCNC: 90 MG/DL (ref ?–100)
LEUKOCYTE ESTERASE UR QL STRIP.AUTO: NEGATIVE
LYMPHOCYTES # BLD AUTO: 1.87 X10(3) UL (ref 1–4)
LYMPHOCYTES NFR BLD AUTO: 21.7 %
MCH RBC QN AUTO: 29.6 PG (ref 26–34)
MCHC RBC AUTO-ENTMCNC: 33.8 G/DL (ref 31–37)
MCV RBC AUTO: 87.7 FL
MONOCYTES # BLD AUTO: 0.66 X10(3) UL (ref 0.1–1)
MONOCYTES NFR BLD AUTO: 7.7 %
NEUTROPHILS # BLD AUTO: 5.87 X10 (3) UL (ref 1.5–7.7)
NEUTROPHILS # BLD AUTO: 5.87 X10(3) UL (ref 1.5–7.7)
NEUTROPHILS NFR BLD AUTO: 68.1 %
NITRITE UR QL STRIP.AUTO: NEGATIVE
NONHDLC SERPL-MCNC: 105 MG/DL (ref ?–130)
OSMOLALITY SERPL CALC.SUM OF ELEC: 279 MOSM/KG (ref 275–295)
PH UR: 6 [PH] (ref 5–8)
PLATELET # BLD AUTO: 217 10(3)UL (ref 150–450)
POTASSIUM SERPL-SCNC: 4.3 MMOL/L (ref 3.5–5.1)
PROT SERPL-MCNC: 7.6 G/DL (ref 6.4–8.2)
PROT UR-MCNC: 100 MG/DL
RBC # BLD AUTO: 5.3 X10(6)UL
SODIUM SERPL-SCNC: 134 MMOL/L (ref 136–145)
SP GR UR STRIP: 1.03 (ref 1–1.03)
TRIGL SERPL-MCNC: 78 MG/DL (ref 30–149)
TSI SER-ACNC: 1.61 MIU/ML (ref 0.36–3.74)
UROBILINOGEN UR STRIP-ACNC: <2
VIT C UR-MCNC: 40 MG/DL
VLDLC SERPL CALC-MCNC: 13 MG/DL (ref 0–30)
WBC # BLD AUTO: 8.6 X10(3) UL (ref 4–11)

## 2022-10-18 PROCEDURE — 3078F DIAST BP <80 MM HG: CPT | Performed by: INTERNAL MEDICINE

## 2022-10-18 PROCEDURE — 3008F BODY MASS INDEX DOCD: CPT | Performed by: INTERNAL MEDICINE

## 2022-10-18 PROCEDURE — 3074F SYST BP LT 130 MM HG: CPT | Performed by: INTERNAL MEDICINE

## 2022-10-18 PROCEDURE — 90471 IMMUNIZATION ADMIN: CPT | Performed by: INTERNAL MEDICINE

## 2022-10-18 PROCEDURE — 36415 COLL VENOUS BLD VENIPUNCTURE: CPT | Performed by: INTERNAL MEDICINE

## 2022-10-18 PROCEDURE — 99395 PREV VISIT EST AGE 18-39: CPT | Performed by: INTERNAL MEDICINE

## 2022-10-18 PROCEDURE — 90686 IIV4 VACC NO PRSV 0.5 ML IM: CPT | Performed by: INTERNAL MEDICINE

## 2022-10-18 RX ORDER — FINASTERIDE 1 MG/1
1 TABLET, FILM COATED ORAL DAILY
Qty: 90 TABLET | Refills: 1 | Status: CANCELLED | OUTPATIENT
Start: 2022-10-18

## 2022-12-02 ENCOUNTER — LAB ENCOUNTER (OUTPATIENT)
Dept: LAB | Facility: HOSPITAL | Age: 37
End: 2022-12-02
Attending: INTERNAL MEDICINE
Payer: COMMERCIAL

## 2022-12-02 DIAGNOSIS — R80.9 PROTEINURIA, UNSPECIFIED TYPE: ICD-10-CM

## 2022-12-02 LAB
BILIRUB UR QL: NEGATIVE
COLOR UR: YELLOW
GLUCOSE UR-MCNC: NEGATIVE MG/DL
HGB UR QL STRIP.AUTO: NEGATIVE
KETONES UR-MCNC: NEGATIVE MG/DL
LEUKOCYTE ESTERASE UR QL STRIP.AUTO: NEGATIVE
NITRITE UR QL STRIP.AUTO: NEGATIVE
PH UR: 6 [PH] (ref 5–8)
PROT UR-MCNC: 30 MG/DL
SP GR UR STRIP: >1.03 (ref 1–1.03)
UROBILINOGEN UR STRIP-ACNC: <2
VIT C UR-MCNC: 40 MG/DL

## 2022-12-02 PROCEDURE — 81001 URINALYSIS AUTO W/SCOPE: CPT

## 2023-01-16 ENCOUNTER — OFFICE VISIT (OUTPATIENT)
Dept: PHYSICAL MEDICINE AND REHAB | Facility: CLINIC | Age: 38
End: 2023-01-16
Payer: COMMERCIAL

## 2023-01-16 ENCOUNTER — TELEPHONE (OUTPATIENT)
Dept: PHYSICAL MEDICINE AND REHAB | Facility: CLINIC | Age: 38
End: 2023-01-16

## 2023-01-16 VITALS
BODY MASS INDEX: 23.52 KG/M2 | DIASTOLIC BLOOD PRESSURE: 72 MMHG | SYSTOLIC BLOOD PRESSURE: 124 MMHG | WEIGHT: 168 LBS | HEIGHT: 71 IN

## 2023-01-16 DIAGNOSIS — F41.9 ANXIETY: ICD-10-CM

## 2023-01-16 DIAGNOSIS — M67.919 TENDINOPATHY OF ROTATOR CUFF, UNSPECIFIED LATERALITY: ICD-10-CM

## 2023-01-16 DIAGNOSIS — M19.011 OSTEOARTHRITIS OF RIGHT GLENOHUMERAL JOINT: ICD-10-CM

## 2023-01-16 DIAGNOSIS — M75.41 SUBACROMIAL IMPINGEMENT OF RIGHT SHOULDER: ICD-10-CM

## 2023-01-16 DIAGNOSIS — F98.8 ATTENTION DEFICIT DISORDER, UNSPECIFIED HYPERACTIVITY PRESENCE: ICD-10-CM

## 2023-01-16 DIAGNOSIS — M25.511 ACUTE PAIN OF RIGHT SHOULDER: Primary | ICD-10-CM

## 2023-01-16 DIAGNOSIS — M75.51 SUBACROMIAL BURSITIS OF RIGHT SHOULDER JOINT: ICD-10-CM

## 2023-01-16 DIAGNOSIS — M75.21 BICEPS TENDONITIS ON RIGHT: ICD-10-CM

## 2023-01-16 PROCEDURE — 99214 OFFICE O/P EST MOD 30 MIN: CPT | Performed by: PHYSICAL MEDICINE & REHABILITATION

## 2023-01-16 PROCEDURE — 3008F BODY MASS INDEX DOCD: CPT | Performed by: PHYSICAL MEDICINE & REHABILITATION

## 2023-01-16 PROCEDURE — 3078F DIAST BP <80 MM HG: CPT | Performed by: PHYSICAL MEDICINE & REHABILITATION

## 2023-01-16 PROCEDURE — 3074F SYST BP LT 130 MM HG: CPT | Performed by: PHYSICAL MEDICINE & REHABILITATION

## 2023-01-16 NOTE — PATIENT INSTRUCTIONS
1) My office will call you to schedule the RIGHT subacromial CSI under ultrasound guidance once the procedure is approved by your insurance carrier. 2) Lets plan for Wednesday 1/25/23 at 1 pm  3) Continue home exercise program  4) Tylenol 500-1000 mg every 6-8 hours as needed for pain. No more than 3000 mg daily.

## 2023-01-16 NOTE — TELEPHONE ENCOUNTER
Initiated authorization for Right subacromial CSI under ultrasound guidance CPT 29846+ with [employer]  Status: Approved-authorization is not required per health plan

## 2023-01-25 ENCOUNTER — OFFICE VISIT (OUTPATIENT)
Dept: PHYSICAL MEDICINE AND REHAB | Facility: CLINIC | Age: 38
End: 2023-01-25
Payer: COMMERCIAL

## 2023-01-25 DIAGNOSIS — M25.511 ACUTE PAIN OF RIGHT SHOULDER: Primary | ICD-10-CM

## 2023-01-25 DIAGNOSIS — M67.919 TENDINOPATHY OF ROTATOR CUFF, UNSPECIFIED LATERALITY: ICD-10-CM

## 2023-01-25 DIAGNOSIS — M19.011 OSTEOARTHRITIS OF RIGHT GLENOHUMERAL JOINT: ICD-10-CM

## 2023-01-25 DIAGNOSIS — F98.8 ATTENTION DEFICIT DISORDER, UNSPECIFIED HYPERACTIVITY PRESENCE: ICD-10-CM

## 2023-01-25 DIAGNOSIS — M75.21 BICEPS TENDONITIS ON RIGHT: ICD-10-CM

## 2023-01-25 DIAGNOSIS — M75.51 SUBACROMIAL BURSITIS OF RIGHT SHOULDER JOINT: ICD-10-CM

## 2023-01-25 DIAGNOSIS — F41.9 ANXIETY: ICD-10-CM

## 2023-01-25 DIAGNOSIS — M75.41 SUBACROMIAL IMPINGEMENT OF RIGHT SHOULDER: ICD-10-CM

## 2023-01-25 PROCEDURE — 20611 DRAIN/INJ JOINT/BURSA W/US: CPT | Performed by: PHYSICAL MEDICINE & REHABILITATION

## 2023-01-25 RX ORDER — TRIAMCINOLONE ACETONIDE 40 MG/ML
40 INJECTION, SUSPENSION INTRA-ARTICULAR; INTRAMUSCULAR ONCE
Status: COMPLETED | OUTPATIENT
Start: 2023-01-25 | End: 2023-01-25

## 2023-01-25 RX ORDER — LIDOCAINE HYDROCHLORIDE 10 MG/ML
7 INJECTION, SOLUTION INFILTRATION; PERINEURAL ONCE
Status: COMPLETED | OUTPATIENT
Start: 2023-01-25 | End: 2023-01-25

## 2023-01-25 RX ADMIN — LIDOCAINE HYDROCHLORIDE 7 ML: 10 INJECTION, SOLUTION INFILTRATION; PERINEURAL at 16:24:00

## 2023-01-25 NOTE — PATIENT INSTRUCTIONS
Post Injection Instructions     Please do not do anything strenuous over the next two days (if you had a knee injection do not walk more than 2 city blocks, do not attend any aerobic classes, do not run, no heavy lifting, no prolong standing). You may resume your day to day activities after your injection. You may experience some mild amount of swelling after the procedure. Please ice your joint that was injected at least 5-6 times a day (15 minutes) for two days after (this will help prevent worsening pain that sometimes occurs after an injection). Only take tylenol if needed for pain for the first few days. Watch for signs of infection which include redness, warmth, worsening pain, fevers or chills. If you develop any of these signs call the office immediately at [phone]    Everyone responds differently to injections, but you can expect your peak effects a few weeks after your last injection. Krishna Deleon.  Hitesh Carpenter MD  Physical Medicine and Rehabilitation/Sports Medicine  MEDICAL CENTER [facility]

## 2023-02-28 ENCOUNTER — OFFICE VISIT (OUTPATIENT)
Dept: PHYSICAL MEDICINE AND REHAB | Facility: CLINIC | Age: 38
End: 2023-02-28
Payer: COMMERCIAL

## 2023-02-28 ENCOUNTER — TELEPHONE (OUTPATIENT)
Dept: PHYSICAL MEDICINE AND REHAB | Facility: CLINIC | Age: 38
End: 2023-02-28

## 2023-02-28 VITALS
SYSTOLIC BLOOD PRESSURE: 122 MMHG | HEIGHT: 71 IN | HEART RATE: 71 BPM | OXYGEN SATURATION: 99 % | BODY MASS INDEX: 23.52 KG/M2 | WEIGHT: 168 LBS | DIASTOLIC BLOOD PRESSURE: 80 MMHG

## 2023-02-28 DIAGNOSIS — M25.511 ACUTE PAIN OF RIGHT SHOULDER: Primary | ICD-10-CM

## 2023-02-28 DIAGNOSIS — M19.011 OSTEOARTHRITIS OF RIGHT GLENOHUMERAL JOINT: ICD-10-CM

## 2023-02-28 DIAGNOSIS — F41.9 ANXIETY: ICD-10-CM

## 2023-02-28 DIAGNOSIS — M75.21 BICEPS TENDONITIS ON RIGHT: ICD-10-CM

## 2023-02-28 DIAGNOSIS — M75.41 SUBACROMIAL IMPINGEMENT OF RIGHT SHOULDER: ICD-10-CM

## 2023-02-28 DIAGNOSIS — M67.919 TENDINOPATHY OF ROTATOR CUFF, UNSPECIFIED LATERALITY: ICD-10-CM

## 2023-02-28 DIAGNOSIS — M75.51 SUBACROMIAL BURSITIS OF RIGHT SHOULDER JOINT: ICD-10-CM

## 2023-02-28 PROCEDURE — 3008F BODY MASS INDEX DOCD: CPT | Performed by: PHYSICAL MEDICINE & REHABILITATION

## 2023-02-28 PROCEDURE — 99214 OFFICE O/P EST MOD 30 MIN: CPT | Performed by: PHYSICAL MEDICINE & REHABILITATION

## 2023-02-28 PROCEDURE — 3074F SYST BP LT 130 MM HG: CPT | Performed by: PHYSICAL MEDICINE & REHABILITATION

## 2023-02-28 PROCEDURE — 3079F DIAST BP 80-89 MM HG: CPT | Performed by: PHYSICAL MEDICINE & REHABILITATION

## 2023-02-28 NOTE — TELEPHONE ENCOUNTER
Initiated authorization for RIGHT [facility] CSI under ultrasound guidance CPT 97940, [id number] with [employer]  Status: Approved-authorization is not required per health plan          Patient already scheduled 3/8/23

## 2023-02-28 NOTE — PATIENT INSTRUCTIONS
1) My office will call you to schedule the RIGHT [facility] CSI under ultrasound guidance once the procedure is approved by your insurance carrier. 2) Lets plan for 3/8/23 at 12:40  3) Continue home exercise program  4) Tylenol 500-1000 mg every 6-8 hours as needed for pain. No more than 3000 mg daily.

## 2023-03-08 ENCOUNTER — OFFICE VISIT (OUTPATIENT)
Dept: PHYSICAL MEDICINE AND REHAB | Facility: CLINIC | Age: 38
End: 2023-03-08
Payer: COMMERCIAL

## 2023-03-08 VITALS — WEIGHT: 170 LBS | HEIGHT: 71 IN | HEART RATE: 91 BPM | OXYGEN SATURATION: 100 % | BODY MASS INDEX: 23.8 KG/M2

## 2023-03-08 DIAGNOSIS — M19.011 OSTEOARTHRITIS OF RIGHT GLENOHUMERAL JOINT: ICD-10-CM

## 2023-03-08 DIAGNOSIS — M75.21 BICEPS TENDONITIS ON RIGHT: ICD-10-CM

## 2023-03-08 DIAGNOSIS — M75.51 SUBACROMIAL BURSITIS OF RIGHT SHOULDER JOINT: ICD-10-CM

## 2023-03-08 DIAGNOSIS — M67.919 TENDINOPATHY OF ROTATOR CUFF, UNSPECIFIED LATERALITY: ICD-10-CM

## 2023-03-08 DIAGNOSIS — M75.41 SUBACROMIAL IMPINGEMENT OF RIGHT SHOULDER: ICD-10-CM

## 2023-03-08 DIAGNOSIS — M25.511 ACUTE PAIN OF RIGHT SHOULDER: Primary | ICD-10-CM

## 2023-03-08 DIAGNOSIS — F41.9 ANXIETY: ICD-10-CM

## 2023-03-08 PROCEDURE — 99213 OFFICE O/P EST LOW 20 MIN: CPT | Performed by: PHYSICAL MEDICINE & REHABILITATION

## 2023-03-08 PROCEDURE — 3008F BODY MASS INDEX DOCD: CPT | Performed by: PHYSICAL MEDICINE & REHABILITATION

## 2023-09-13 ENCOUNTER — OFFICE VISIT (OUTPATIENT)
Dept: PHYSICAL MEDICINE AND REHAB | Facility: CLINIC | Age: 38
End: 2023-09-13
Payer: COMMERCIAL

## 2023-09-13 VITALS — HEART RATE: 77 BPM | BODY MASS INDEX: 24 KG/M2 | OXYGEN SATURATION: 98 % | WEIGHT: 172 LBS

## 2023-09-13 DIAGNOSIS — M25.511 ACUTE PAIN OF RIGHT SHOULDER: Primary | ICD-10-CM

## 2023-09-13 DIAGNOSIS — M67.919 TENDINOPATHY OF ROTATOR CUFF, UNSPECIFIED LATERALITY: ICD-10-CM

## 2023-09-13 DIAGNOSIS — M19.011 OSTEOARTHRITIS OF RIGHT GLENOHUMERAL JOINT: ICD-10-CM

## 2023-09-13 DIAGNOSIS — M75.51 SUBACROMIAL BURSITIS OF RIGHT SHOULDER JOINT: ICD-10-CM

## 2023-09-13 DIAGNOSIS — F41.9 ANXIETY: ICD-10-CM

## 2023-09-13 DIAGNOSIS — M75.41 SUBACROMIAL IMPINGEMENT OF RIGHT SHOULDER: ICD-10-CM

## 2023-09-13 DIAGNOSIS — M75.21 BICEPS TENDONITIS ON RIGHT: ICD-10-CM

## 2023-09-13 PROCEDURE — 99214 OFFICE O/P EST MOD 30 MIN: CPT | Performed by: PHYSICAL MEDICINE & REHABILITATION

## 2023-09-13 NOTE — PATIENT INSTRUCTIONS
1) My office will call you to schedule the right RIGHT biceps tendon sheath CSI under ultrasound guidance  once the procedure is approved by your insurance carrier. 9/26 at 11 am in [address] [place] [address]    2) My office will call you to schedule the RIGHT [address] under ultrasound guidance once the procedure is approved by your insurance carrier.   In 1 month

## 2023-09-14 ENCOUNTER — TELEPHONE (OUTPATIENT)
Dept: PHYSICAL MEDICINE AND REHAB | Facility: CLINIC | Age: 38
End: 2023-09-14

## 2023-09-26 ENCOUNTER — MED REC SCAN ONLY (OUTPATIENT)
Dept: PHYSICAL MEDICINE AND REHAB | Facility: CLINIC | Age: 38
End: 2023-09-26

## 2023-09-26 ENCOUNTER — OFFICE VISIT (OUTPATIENT)
Dept: PHYSICAL MEDICINE AND REHAB | Facility: CLINIC | Age: 38
End: 2023-09-26
Payer: COMMERCIAL

## 2023-09-26 DIAGNOSIS — M25.511 ACUTE PAIN OF RIGHT SHOULDER: Primary | ICD-10-CM

## 2023-09-26 DIAGNOSIS — M75.51 SUBACROMIAL BURSITIS OF RIGHT SHOULDER JOINT: ICD-10-CM

## 2023-09-26 PROCEDURE — 20550 NJX 1 TENDON SHEATH/LIGAMENT: CPT | Performed by: PHYSICAL MEDICINE & REHABILITATION

## 2023-09-26 PROCEDURE — 76942 ECHO GUIDE FOR BIOPSY: CPT | Performed by: PHYSICAL MEDICINE & REHABILITATION

## 2023-09-26 RX ORDER — LIDOCAINE HYDROCHLORIDE 10 MG/ML
5 INJECTION, SOLUTION INFILTRATION; PERINEURAL ONCE
Status: COMPLETED | OUTPATIENT
Start: 2023-09-26 | End: 2023-09-26

## 2023-09-26 RX ORDER — TRIAMCINOLONE ACETONIDE 40 MG/ML
40 INJECTION, SUSPENSION INTRA-ARTICULAR; INTRAMUSCULAR ONCE
Status: COMPLETED | OUTPATIENT
Start: 2023-09-26 | End: 2023-09-26

## 2023-09-26 NOTE — PROCEDURES
[address]   Shoulder Injection Procedure Note    CHIEF COMPLAINT:  Patient presents with: Injection: Biceps tendon sheet      PROCEDURE PERFORMED:  Right  biceps tendon sheath   corticosteroid injection under ultrasound guidance    INDICATIONS:  Right shoulder pain    PRIMARY PROCEDURALIST:  Carmel Aranda MD    INFORMED CONSENT & TIME OUT:   As documented in the Time Out and Pre-Procedure Check Lists. Verbal consent was obtained    Vitals: [unfilled]  Labs (document last wbc, plts, hgb, and PT/INR):   No visits with results within 6 Month(s) from this visit. Latest known visit with results is:   [facility] Lab Encounter on 12/02/2022   Component Date Value Ref Range Status    Urine Color 12/02/2022 Yellow  Yellow Final    Clarity Urine 12/02/2022 Hazy (A)  Clear Final    Spec Gravity 12/02/2022 >1.030 (H)  1.001 - 1.030 Final    Glucose Urine 12/02/2022 Negative  Negative mg/dL Final    Bilirubin Urine 12/02/2022 Negative  Negative Final    Ketones Urine 12/02/2022 Negative  Negative mg/dL Final    Blood Urine 12/02/2022 Negative  Negative Final    pH Urine 12/02/2022 6.0  5.0 - 8.0 Final    Protein Urine 12/02/2022 30 (A)  Negative mg/dL Final    Urobilinogen Urine 12/02/2022 <2.0  <2.0 Final    Nitrite Urine 12/02/2022 Negative  Negative Final    Leukocyte Esterase Urine 12/02/2022 Negative  Negative Final    WBC Urine 12/02/2022 1-5  0 - 5 /HPF Final    RBC Urine 12/02/2022 0-2  0 - 2 /HPF Final    Bacteria Urine 12/02/2022 None Seen  None Seen /HPF Final    Squamous Epi. Cells 12/02/2022 Few (A)  None Seen /HPF Final    Ca Oxalate Crystals 12/02/2022 Moderate (A)  None Seen /HPF Final    Ascorbic Acid Urine 12/02/2022 40 (A)  Negative mg/dL Final   ]    PROCEDURE:    The risks and benefits of intraarticular corticosteroid injection were again explained to the patient and verbal consent was obtained. The Right shoulder was prepped and draped in the usual sterile fashion. Using a linear ultrasound transducer, the Right  biceps tendon  was identified. A 25 G 1.5-inch needle was introduced into the shoulder while injecting 2 cc of 1% lidocain under ultrasound guidance. The needle was seen in the right biceps tendon sheath. Aspiration was attempted and there was 2.75cc able to be aspirated. We switched the syringe to one containing 2mL of 1% lidocaine and 1 mL of 40 mg/mL Kenalog and it was injected. The needle was then removed and hemostasis was obtained. The skin site was cleansed and a clean dressing was applied. The patient tolerated the procedure well. Patient verbalized understanding of assessment and plan. Patient is in agreement with the plan. All questions were answered. No barriers to learning identified. Permanent pictures were saved. INSTRUCTIONS GIVEN TO PATIENT:    \"You will see an effect in the next 2-3 days. Please contact me if you have fevers, worsening swelling, worsening pain, decreased range of motion, increased redness, chills, or anything that makes you concerned about how the joint we injected feels/looks. If you do not reach me in a reasonable time, please report directly to the emergency room for further evaluation\"      Zaina Galvez.  Elenita Pineda MD, [address] [facility]  Physical Medicine and Rehabilitation/Sports Medicine  MEDICAL [facility]

## 2023-09-26 NOTE — PATIENT INSTRUCTIONS
Post Injection Instructions     Please do not do anything strenuous over the next two days (if you had a knee injection do not walk more than 2 city blocks, do not attend any aerobic classes, do not run, no heavy lifting, no prolong standing). You may resume your day to day activities after your injection. You may experience some mild amount of swelling after the procedure. Please ice your joint that was injected at least 5-6 times a day (15 minutes) for two days after (this will help prevent worsening pain that sometimes occurs after an injection). Only take tylenol if needed for pain for the first few days. Watch for signs of infection which include redness, warmth, worsening pain, fevers or chills. If you develop any of these signs call the office immediately at [phone]    Everyone responds differently to injections, but you can expect your peak effects a few weeks after your last injection. Chanelle Rodríguez.  Kirt Anne MD  Physical Medicine and Rehabilitation/Sports Medicine  MEDICAL CENTER [facility]

## 2023-10-23 ENCOUNTER — OFFICE VISIT (OUTPATIENT)
Dept: PHYSICAL MEDICINE AND REHAB | Facility: CLINIC | Age: 38
End: 2023-10-23
Payer: COMMERCIAL

## 2023-10-23 DIAGNOSIS — M25.511 ACUTE PAIN OF RIGHT SHOULDER: Primary | ICD-10-CM

## 2023-10-23 DIAGNOSIS — M19.011 OSTEOARTHRITIS OF RIGHT GLENOHUMERAL JOINT: ICD-10-CM

## 2023-10-23 DIAGNOSIS — M75.41 SUBACROMIAL IMPINGEMENT OF RIGHT SHOULDER: ICD-10-CM

## 2023-10-23 DIAGNOSIS — M75.51 SUBACROMIAL BURSITIS OF RIGHT SHOULDER JOINT: ICD-10-CM

## 2023-10-23 RX ORDER — LIDOCAINE HYDROCHLORIDE 10 MG/ML
9 INJECTION, SOLUTION INFILTRATION; PERINEURAL ONCE
Status: COMPLETED | OUTPATIENT
Start: 2023-10-23 | End: 2023-10-23

## 2023-10-23 RX ORDER — TRIAMCINOLONE ACETONIDE 40 MG/ML
40 INJECTION, SUSPENSION INTRA-ARTICULAR; INTRAMUSCULAR ONCE
Status: COMPLETED | OUTPATIENT
Start: 2023-10-23 | End: 2023-10-23

## 2023-10-23 RX ADMIN — TRIAMCINOLONE ACETONIDE 40 MG: 40 INJECTION, SUSPENSION INTRA-ARTICULAR; INTRAMUSCULAR at 15:59:00

## 2023-10-23 RX ADMIN — LIDOCAINE HYDROCHLORIDE 9 ML: 10 INJECTION, SOLUTION INFILTRATION; PERINEURAL at 15:58:00

## 2023-10-23 NOTE — PATIENT INSTRUCTIONS
Post Injection Instructions     Please do not do anything strenuous over the next two days (if you had a knee injection do not walk more than 2 city blocks, do not attend any aerobic classes, do not run, no heavy lifting, no prolong standing). You may resume your day to day activities after your injection. You may experience some mild amount of swelling after the procedure. Please ice your joint that was injected at least 5-6 times a day (15 minutes) for two days after (this will help prevent worsening pain that sometimes occurs after an injection). Only take tylenol if needed for pain for the first few days. Watch for signs of infection which include redness, warmth, worsening pain, fevers or chills. If you develop any of these signs call the office immediately at [phone]    Everyone responds differently to injections, but you can expect your peak effects a few weeks after your last injection. Isaak Felix.  Murali Cote MD  Physical Medicine and Rehabilitation/Sports Medicine  MEDICAL CENTER [facility]

## 2023-10-23 NOTE — PROCEDURES
[address]   Shoulder Injection Procedure Note    CHIEF COMPLAINT:  No chief complaint on file. PROCEDURE PERFORMED:  Right glenohumeral joint  corticosteroid injection under ultrasound guidance    INDICATIONS:  Right shoulder pain    PRIMARY PROCEDURALIST:  Faiza Brian MD    INFORMED CONSENT & TIME OUT:   As documented in the Time Out and Pre-Procedure Check Lists. Verbal consent was obtained    Vitals: [unfilled]  Labs (document last wbc, plts, hgb, and PT/INR):   No visits with results within 6 Month(s) from this visit. Latest known visit with results is:   [facility] Lab Encounter on 12/02/2022   Component Date Value Ref Range Status    Urine Color 12/02/2022 Yellow  Yellow Final    Clarity Urine 12/02/2022 Hazy (A)  Clear Final    Spec Gravity 12/02/2022 >1.030 (H)  1.001 - 1.030 Final    Glucose Urine 12/02/2022 Negative  Negative mg/dL Final    Bilirubin Urine 12/02/2022 Negative  Negative Final    Ketones Urine 12/02/2022 Negative  Negative mg/dL Final    Blood Urine 12/02/2022 Negative  Negative Final    pH Urine 12/02/2022 6.0  5.0 - 8.0 Final    Protein Urine 12/02/2022 30 (A)  Negative mg/dL Final    Urobilinogen Urine 12/02/2022 <2.0  <2.0 Final    Nitrite Urine 12/02/2022 Negative  Negative Final    Leukocyte Esterase Urine 12/02/2022 Negative  Negative Final    WBC Urine 12/02/2022 1-5  0 - 5 /HPF Final    RBC Urine 12/02/2022 0-2  0 - 2 /HPF Final    Bacteria Urine 12/02/2022 None Seen  None Seen /HPF Final    Squamous Epi. Cells 12/02/2022 Few (A)  None Seen /HPF Final    Ca Oxalate Crystals 12/02/2022 Moderate (A)  None Seen /HPF Final    Ascorbic Acid Urine 12/02/2022 40 (A)  Negative mg/dL Final   ]    PROCEDURE:    The risks and benefits of intraarticular corticosteroid injection were again explained to the patient and verbal consent was obtained. The Right shoulder was prepped and draped in the usual sterile fashion.  Using a linear ultrasound transducer, the Right glenohumeral joint was identified. A 22 G 3.5-inch needle was introduced into the shoulder while injecting 4 cc of 1% lidocain under ultrasound guidance. The needle was seen in the glenohumeral space. Aspiration was attempted and there was no fluid able to be aspirated. We switched the syringe to one containing 4 mL of 1% lidocaine and 1 mL of 40 mg/mL Kenalog and it was injected. The needle was then removed and hemostasis was obtained. The skin site was cleansed and a clean dressing was applied. The patient tolerated the procedure well. Patient verbalized understanding of assessment and plan. Patient is in agreement with the plan. All questions were answered. No barriers to learning identified. Permanent pictures were saved. INSTRUCTIONS GIVEN TO PATIENT:    \"You will see an effect in the next 2-3 days. Please contact me if you have fevers, worsening swelling, worsening pain, decreased range of motion, increased redness, chills, or anything that makes you concerned about how the joint we injected feels/looks. If you do not reach me in a reasonable time, please report directly to the emergency room for further evaluation\"      Vinita Edge.  Shawna Arguelles MD, [address] [facility]  Physical Medicine and Rehabilitation/Sports Medicine  MEDICAL [facility]

## 2023-10-27 ENCOUNTER — OFFICE VISIT (OUTPATIENT)
Dept: INTERNAL MEDICINE CLINIC | Facility: CLINIC | Age: 38
End: 2023-10-27

## 2023-10-27 VITALS
WEIGHT: 172 LBS | SYSTOLIC BLOOD PRESSURE: 106 MMHG | HEART RATE: 78 BPM | OXYGEN SATURATION: 98 % | TEMPERATURE: 98 F | DIASTOLIC BLOOD PRESSURE: 66 MMHG | HEIGHT: 71 IN | RESPIRATION RATE: 17 BRPM | BODY MASS INDEX: 24.08 KG/M2

## 2023-10-27 DIAGNOSIS — Z80.42 FH: PROSTATE CANCER: ICD-10-CM

## 2023-10-27 DIAGNOSIS — R19.5 ABNORMAL STOOLS: ICD-10-CM

## 2023-10-27 DIAGNOSIS — Z00.00 ANNUAL PHYSICAL EXAM: Primary | ICD-10-CM

## 2023-10-27 DIAGNOSIS — L64.9 MALE PATTERN ALOPECIA: ICD-10-CM

## 2023-10-27 DIAGNOSIS — F41.1 GENERALIZED ANXIETY DISORDER: ICD-10-CM

## 2023-10-27 DIAGNOSIS — K92.1 BLOODY STOOL: ICD-10-CM

## 2023-10-31 NOTE — PROGRESS NOTES
Subjective:     Patient ID: Radha Velasco is a 42year old male. HPI    Patient comes in today for annual physical overall doing okay except. Lately has seen some change in his stool with some blood on and off no pain no fever no chills    History/Other:   Review of Systems   Constitutional: Negative. HENT: Negative. Eyes: Negative. Respiratory: Negative. Cardiovascular: Negative. Gastrointestinal:  Positive for blood in stool. Genitourinary: Negative. Musculoskeletal: Negative. Skin: Negative. Neurological: Negative. Psychiatric/Behavioral: Negative. Current Outpatient Medications   Medication Sig Dispense Refill    finasteride (PROPECIA) 1 MG Oral Tab Take 1 tablet (1 mg total) by mouth daily. 90 tablet 1    Atomoxetine HCl 60 MG Oral Cap       traZODone HCl 150 MG Oral Tab Take 1 tablet (150 mg total) by mouth as needed. Allergies:No Known Allergies    No past medical history on file. No past surgical history on file. Family History   Problem Relation Age of Onset    High Blood Pressure Mother     Prostate Cancer Father     Colon Cancer Father     No Known Problems Brother     Colon Cancer Maternal Grandfather     Heart Disorder Maternal Grandfather       Social History:   Social History     Socioeconomic History    Marital status:    Tobacco Use    Smoking status: Former     Types: Cigarettes     Quit date:      Years since quittin.8    Smokeless tobacco: Never   Vaping Use    Vaping Use: Never used   Substance and Sexual Activity    Alcohol use: Not Currently     Comment: weekends    Drug use: Never        Objective:   Physical Exam  Vitals and nursing note reviewed. Constitutional:       Appearance: He is well-developed. HENT:      Head: Normocephalic and atraumatic.       Right Ear: External ear normal.      Left Ear: External ear normal.      Nose: Nose normal.   Eyes:      Conjunctiva/sclera: Conjunctivae normal.      Pupils: Pupils are equal, round, and reactive to light. Cardiovascular:      Rate and Rhythm: Normal rate and regular rhythm. Heart sounds: Normal heart sounds. Pulmonary:      Effort: Pulmonary effort is normal.      Breath sounds: Normal breath sounds. Abdominal:      General: Bowel sounds are normal.      Palpations: Abdomen is soft. Genitourinary:     Penis: Normal.       Prostate: Normal.      Rectum: Normal.   Musculoskeletal:         General: Normal range of motion. Cervical back: Normal range of motion and neck supple. Skin:     General: Skin is warm and dry. Neurological:      Mental Status: He is alert and oriented to person, place, and time. Deep Tendon Reflexes: Reflexes are normal and symmetric.          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  Abnormal stools-will check labs we will check stool for blood  Bloody stool as above we will check occult blood test any worsening symptoms let us know    Orders Placed This Encounter      CBC With Differential With Platelet      Comp Metabolic Panel (14)      Lipid Panel      TSH W Reflex To Free T4      Urinalysis, Routine      Fluzone Quadrivalent 6mo+ 0.5mL      Occult Blood Stool, Diagnostic      Meds This Visit:  Requested Prescriptions      No prescriptions requested or ordered in this encounter       Imaging & Referrals:  INFLUENZA VACCINE, QUAD, PRESERVATIVE FREE, 0.5 ML  EKG 12-LEAD

## 2024-01-23 ENCOUNTER — EKG ENCOUNTER (OUTPATIENT)
Dept: LAB | Facility: HOSPITAL | Age: 39
End: 2024-01-23
Attending: INTERNAL MEDICINE
Payer: COMMERCIAL

## 2024-01-23 DIAGNOSIS — Z00.00 ANNUAL PHYSICAL EXAM: ICD-10-CM

## 2024-01-23 DIAGNOSIS — K92.1 BLOODY STOOL: ICD-10-CM

## 2024-01-23 DIAGNOSIS — Z80.42 FH: PROSTATE CANCER: ICD-10-CM

## 2024-01-23 DIAGNOSIS — R19.5 ABNORMAL STOOLS: ICD-10-CM

## 2024-01-23 DIAGNOSIS — F41.1 GENERALIZED ANXIETY DISORDER: ICD-10-CM

## 2024-01-23 DIAGNOSIS — L64.9 MALE PATTERN ALOPECIA: ICD-10-CM

## 2024-01-23 LAB
ALBUMIN SERPL-MCNC: 4.3 G/DL (ref 3.2–4.8)
ALBUMIN/GLOB SERPL: 2.2 {RATIO} (ref 1–2)
ALP LIVER SERPL-CCNC: 81 U/L
ALT SERPL-CCNC: 63 U/L
ANION GAP SERPL CALC-SCNC: 4 MMOL/L (ref 0–18)
AST SERPL-CCNC: 41 U/L (ref ?–34)
ATRIAL RATE: 73 BPM
BASOPHILS # BLD AUTO: 0.03 X10(3) UL (ref 0–0.2)
BASOPHILS NFR BLD AUTO: 0.6 %
BILIRUB SERPL-MCNC: 0.7 MG/DL (ref 0.3–1.2)
BILIRUB UR QL: NEGATIVE
BUN BLD-MCNC: 15 MG/DL (ref 9–23)
BUN/CREAT SERPL: 15.6 (ref 10–20)
CALCIUM BLD-MCNC: 9.5 MG/DL (ref 8.7–10.4)
CHLORIDE SERPL-SCNC: 105 MMOL/L (ref 98–112)
CHOLEST SERPL-MCNC: 157 MG/DL (ref ?–200)
CLARITY UR: CLEAR
CO2 SERPL-SCNC: 29 MMOL/L (ref 21–32)
COLOR UR: YELLOW
COMPLEXED PSA SERPL-MCNC: 0.33 NG/ML (ref ?–4)
CREAT BLD-MCNC: 0.96 MG/DL
DEPRECATED RDW RBC AUTO: 37.2 FL (ref 35.1–46.3)
EGFRCR SERPLBLD CKD-EPI 2021: 102 ML/MIN/1.73M2 (ref 60–?)
EOSINOPHIL # BLD AUTO: 0.12 X10(3) UL (ref 0–0.7)
EOSINOPHIL NFR BLD AUTO: 2.3 %
ERYTHROCYTE [DISTWIDTH] IN BLOOD BY AUTOMATED COUNT: 11.8 % (ref 11–15)
FASTING PATIENT LIPID ANSWER: YES
FASTING STATUS PATIENT QL REPORTED: YES
GLOBULIN PLAS-MCNC: 2 G/DL (ref 2.8–4.4)
GLUCOSE BLD-MCNC: 94 MG/DL (ref 70–99)
GLUCOSE UR-MCNC: NORMAL MG/DL
HCT VFR BLD AUTO: 43.6 %
HDLC SERPL-MCNC: 69 MG/DL (ref 40–59)
HGB BLD-MCNC: 14.5 G/DL
HGB UR QL STRIP.AUTO: NEGATIVE
IMM GRANULOCYTES # BLD AUTO: 0.02 X10(3) UL (ref 0–1)
IMM GRANULOCYTES NFR BLD: 0.4 %
KETONES UR-MCNC: NEGATIVE MG/DL
LDLC SERPL CALC-MCNC: 79 MG/DL (ref ?–100)
LEUKOCYTE ESTERASE UR QL STRIP.AUTO: NEGATIVE
LYMPHOCYTES # BLD AUTO: 1.78 X10(3) UL (ref 1–4)
LYMPHOCYTES NFR BLD AUTO: 33.5 %
MCH RBC QN AUTO: 28.8 PG (ref 26–34)
MCHC RBC AUTO-ENTMCNC: 33.3 G/DL (ref 31–37)
MCV RBC AUTO: 86.7 FL
MONOCYTES # BLD AUTO: 0.58 X10(3) UL (ref 0.1–1)
MONOCYTES NFR BLD AUTO: 10.9 %
NEUTROPHILS # BLD AUTO: 2.79 X10 (3) UL (ref 1.5–7.7)
NEUTROPHILS # BLD AUTO: 2.79 X10(3) UL (ref 1.5–7.7)
NEUTROPHILS NFR BLD AUTO: 52.3 %
NITRITE UR QL STRIP.AUTO: NEGATIVE
NONHDLC SERPL-MCNC: 88 MG/DL (ref ?–130)
OSMOLALITY SERPL CALC.SUM OF ELEC: 287 MOSM/KG (ref 275–295)
P AXIS: 54 DEGREES
P-R INTERVAL: 152 MS
PH UR: 8 [PH] (ref 5–8)
PLATELET # BLD AUTO: 193 10(3)UL (ref 150–450)
POTASSIUM SERPL-SCNC: 4.6 MMOL/L (ref 3.5–5.1)
PROT SERPL-MCNC: 6.3 G/DL (ref 5.7–8.2)
PROT UR-MCNC: NEGATIVE MG/DL
Q-T INTERVAL: 392 MS
QRS DURATION: 94 MS
QTC CALCULATION (BEZET): 431 MS
R AXIS: 6 DEGREES
RBC # BLD AUTO: 5.03 X10(6)UL
SODIUM SERPL-SCNC: 138 MMOL/L (ref 136–145)
SP GR UR STRIP: 1.03 (ref 1–1.03)
T AXIS: 52 DEGREES
TRIGL SERPL-MCNC: 42 MG/DL (ref 30–149)
TSI SER-ACNC: 2.23 MIU/ML (ref 0.55–4.78)
UROBILINOGEN UR STRIP-ACNC: NORMAL
VENTRICULAR RATE: 73 BPM
VLDLC SERPL CALC-MCNC: 7 MG/DL (ref 0–30)
WBC # BLD AUTO: 5.3 X10(3) UL (ref 4–11)

## 2024-01-23 PROCEDURE — 81003 URINALYSIS AUTO W/O SCOPE: CPT

## 2024-01-23 PROCEDURE — 80053 COMPREHEN METABOLIC PANEL: CPT

## 2024-01-23 PROCEDURE — 85025 COMPLETE CBC W/AUTO DIFF WBC: CPT

## 2024-01-23 PROCEDURE — 80061 LIPID PANEL: CPT

## 2024-01-23 PROCEDURE — 84443 ASSAY THYROID STIM HORMONE: CPT

## 2024-01-23 PROCEDURE — 93005 ELECTROCARDIOGRAM TRACING: CPT

## 2024-01-23 PROCEDURE — 36415 COLL VENOUS BLD VENIPUNCTURE: CPT

## 2024-01-23 PROCEDURE — 93010 ELECTROCARDIOGRAM REPORT: CPT | Performed by: INTERNAL MEDICINE

## 2024-01-26 ENCOUNTER — LAB ENCOUNTER (OUTPATIENT)
Dept: LAB | Facility: HOSPITAL | Age: 39
End: 2024-01-26
Attending: INTERNAL MEDICINE
Payer: COMMERCIAL

## 2024-01-26 DIAGNOSIS — R79.89 ELEVATED LFTS: ICD-10-CM

## 2024-01-26 LAB
ALBUMIN SERPL-MCNC: 4.6 G/DL (ref 3.2–4.8)
ALP LIVER SERPL-CCNC: 91 U/L
ALT SERPL-CCNC: 63 U/L
AST SERPL-CCNC: 45 U/L (ref ?–34)
BILIRUB DIRECT SERPL-MCNC: 0.2 MG/DL (ref ?–0.3)
BILIRUB SERPL-MCNC: 0.6 MG/DL (ref 0.3–1.2)
PROT SERPL-MCNC: 7.3 G/DL (ref 5.7–8.2)

## 2024-01-26 PROCEDURE — 36415 COLL VENOUS BLD VENIPUNCTURE: CPT

## 2024-01-26 PROCEDURE — 80076 HEPATIC FUNCTION PANEL: CPT

## 2024-02-18 ENCOUNTER — HOSPITAL ENCOUNTER (OUTPATIENT)
Dept: ULTRASOUND IMAGING | Age: 39
Discharge: HOME | End: 2024-02-18
Attending: INTERNAL MEDICINE
Payer: COMMERCIAL

## 2024-02-18 ENCOUNTER — HOSPITAL ENCOUNTER (OUTPATIENT)
Dept: ULTRASOUND IMAGING | Age: 39
End: 2024-02-18
Attending: INTERNAL MEDICINE
Payer: COMMERCIAL

## 2024-02-18 DIAGNOSIS — R79.89 ELEVATED LFTS: ICD-10-CM

## 2024-02-18 PROCEDURE — 76705 ECHO EXAM OF ABDOMEN: CPT | Performed by: INTERNAL MEDICINE

## 2024-03-08 ENCOUNTER — TELEPHONE (OUTPATIENT)
Facility: CLINIC | Age: 39
End: 2024-03-08

## 2024-03-08 ENCOUNTER — OFFICE VISIT (OUTPATIENT)
Facility: CLINIC | Age: 39
End: 2024-03-08

## 2024-03-08 VITALS
SYSTOLIC BLOOD PRESSURE: 100 MMHG | WEIGHT: 180 LBS | HEART RATE: 73 BPM | HEIGHT: 71 IN | BODY MASS INDEX: 25.2 KG/M2 | DIASTOLIC BLOOD PRESSURE: 61 MMHG

## 2024-03-08 DIAGNOSIS — R79.89 ELEVATED LFTS: ICD-10-CM

## 2024-03-08 DIAGNOSIS — Z12.11 COLON CANCER SCREENING: ICD-10-CM

## 2024-03-08 DIAGNOSIS — Z80.0 FAMILY HISTORY OF COLON CANCER: Primary | ICD-10-CM

## 2024-03-08 DIAGNOSIS — Z12.11 SCREENING FOR COLON CANCER: Primary | ICD-10-CM

## 2024-03-08 DIAGNOSIS — Z80.0 FAMILY HISTORY OF COLON CANCER: ICD-10-CM

## 2024-03-08 DIAGNOSIS — M25.551 RIGHT-SIDED ISCHIAL PAIN: ICD-10-CM

## 2024-03-08 DIAGNOSIS — M25.551 ISCHIAL PAIN, RIGHT: ICD-10-CM

## 2024-03-08 PROCEDURE — 3008F BODY MASS INDEX DOCD: CPT

## 2024-03-08 PROCEDURE — 99244 OFF/OP CNSLTJ NEW/EST MOD 40: CPT

## 2024-03-08 PROCEDURE — 3074F SYST BP LT 130 MM HG: CPT

## 2024-03-08 PROCEDURE — 3078F DIAST BP <80 MM HG: CPT

## 2024-03-08 RX ORDER — RUFINAMIDE 40 MG/ML
1 SUSPENSION ORAL DAILY
COMMUNITY

## 2024-03-08 RX ORDER — BUPROPION HYDROCHLORIDE 300 MG/1
300 TABLET ORAL DAILY
COMMUNITY

## 2024-03-08 RX ORDER — CYANOCOBALAMIN (VITAMIN B-12) 500 MCG
TABLET ORAL
COMMUNITY

## 2024-03-08 RX ORDER — SODIUM, POTASSIUM,MAG SULFATES 17.5-3.13G
SOLUTION, RECONSTITUTED, ORAL ORAL
Qty: 1 EACH | Refills: 0 | Status: SHIPPED | OUTPATIENT
Start: 2024-03-08

## 2024-03-08 NOTE — H&P
[facility] - Gastroenterology                                                                                                               Reason for consult: elevated LFTs, family hx colon cancer    Requesting physician or provider: Mariano Villa MD    Chief Complaint   Patient presents with    Liver Enzymes    Consult     Family Hx of Colon cancer       HPI:   Jim Cat is a 38 year old year-old male with medical history including ADD, anxiety.    he is here today for evaluation  #elevated LFTs  -AST & ALT mild elevation in January 2024. Previously normal in Oct 2022. Ultrasound abdomen done 2/18: normal appearing liver & gallbladder. Pancreas partially obscured by bowel  -pancreatitis in 2016 from heavy ETOH use after honeymoon to [facility]    #family hx colon CA  -maternal grandpa, colon cancer diagnosed around age 50  -patient has chronic right low back and right side pain and is concerned this could be coming from deep abdomen/colon    Personal hx of liver disease or hepatitis: none   Family hx of liver disease:  none   Etoh use: none past 2.5 years  Meds, herbals, vitamins: no hepatic toxic agent identified that requires dose adjustment at this time  -med list in EPIC- see assessment portion in note  -other: takes ashwagandha supplement occasionally. Advised patient to avoid  Illicit drug use:  none   Autoimmune conditions: none   Toxin exposure at work:         Patient denies symptoms of nausea, vomiting, dyspepsia, dysphagia, odynophagia, globus sensation, heartburn, hematemesis, abdominal pain, change in bowel habits, constipation, diarrhea, hematochezia, or melena. he denies recent change in appetite, fever or unintentional weight loss.      Last colonoscopy: none   Last EGD: none     NSAIDS: none   Tobacco: former 2000  Alcohol: former heavy use, none past 2.5 years    Marijuana: none   Illicit  drugs:none     FH GI malignancy: maternal grandpa - colon cancer, diagnosed around age 50  FH IBD: none     No history of adverse reaction to sedation  No MODESTA  No anticoagulants  No pacemaker/defibrillator  YES pain medications and/or sleep aides - trazodone as needed     Wt Readings from Last 6 Encounters:   24 180 lb (81.6 kg)   10/27/23 172 lb (78 kg)   23 172 lb (78 kg)   23 170 lb (77.1 kg)   23 168 lb (76.2 kg)   23 168 lb (76.2 kg)        History, Medications, Allergies, ROS:      Past Medical History:   Diagnosis Date    ADD (attention deficit disorder)     Anxiety     Male pattern alopecia       History reviewed. No pertinent surgical history.   Family Hx:   Family History   Problem Relation Age of Onset    High Blood Pressure Mother     Prostate Cancer Father     No Known Problems Brother     Colon Cancer Maternal Grandfather     Heart Disorder Maternal Grandfather       Social History:   Social History     Socioeconomic History    Marital status:    Tobacco Use    Smoking status: Former     Types: Cigarettes     Quit date:      Years since quittin.2    Smokeless tobacco: Never   Vaping Use    Vaping Use: Never used   Substance and Sexual Activity    Alcohol use: Not Currently     Comment: none past 2.5 years, former heavy use    Drug use: Never        Medications (Active prior to today's visit):  Current Outpatient Medications   Medication Sig Dispense Refill    buPROPion  MG Oral Tablet 24 Hr Take 1 tablet (300 mg total) by mouth daily.      multivitamin Oral Chew Tab Chew 1 tablet by mouth daily.      Omega-3 Fatty Acids (FISH OIL) 300 MG Oral Cap Take by mouth.      Misc Natural Products (SUPER GREENS OR) Take by mouth.      psyllium 28 % Oral Powd Pack Take 1 packet by mouth 2 (two) times daily.      Na Sulfate-K Sulfate-Mg Sulf (SUPREP BOWEL PREP KIT) 17.5-3.13-1.6 GM/177ML Oral Solution Take as directed by GI clinic. Okay to substitute for generic.  1 each 0    finasteride (PROPECIA) 1 MG Oral Tab Take 1 tablet (1 mg total) by mouth daily. 90 tablet 1    Atomoxetine HCl 60 MG Oral Cap       traZODone HCl 150 MG Oral Tab Take 1 tablet (150 mg total) by mouth as needed.         Allergies:  No Known Allergies    ROS:   CONSTITUTIONAL: negative for fevers, chills, sweats  EYES Negative for scleral icterus or redness, and diplopia  HEENT: Negative for hoarseness  RESPIRATORY: Negative for cough and severe shortness of breath  CARDIOVASCULAR: Negative for crushing sub-sternal chest pain  GASTROINTESTINAL: See HPI  GENITOURINARY: Negative for dysuria  MUSCULOSKELETAL: Negative for arthralgias and myalgias  SKIN: Negative for jaundice, rash or pruritus  NEUROLOGICAL: Negative for dizziness and headaches  BEHAVIOR/PSYCH: Negative for psychotic behavior    PHYSICAL EXAM:   Blood pressure 100/61, pulse 73, height 5' 11\" (1.803 m), weight 180 lb (81.6 kg).    GEN: Alert, no acute distress, well-nourished   HEENT: anicteric sclera, neck supple, trachea midline, MMM, no palpable or tender neck or supraclavicular lymph nodes  CV: RRR, the extremities are warm and well perfused   LUNGS: No increased work of breathing, CTAB  ABDOMEN: Soft, symmetrical, non-tender without distention or guarding. No scars or lesions. Aorta is without bruit or visible pulsation. Umbilicus is midline without herniation. Normoactive bowel sounds are present, No masses, hepatomegaly or splenomegaly noted.  MSK: No erythema, no warmth, no swelling of joints  SKIN: No jaundice, no erythema, no rashes, no lesions  HEMATOLOGIC: No bleeding, no bruising  NEURO: Alert and interactive, NEAL  PSYCH: appropriate mood & affect    Labs/Imaging/Procedures:     Patient's pertinent labs and imaging were reviewed and discussed with patient today.        .  ASSESSMENT/PLAN:   Jim Cat is a 38 year old year-old male with medical history including ADD, anxiety.    he is here today for evaluation  #family hx  colon CA  -maternal grandpa, colon cancer diagnosed around age 50  -patient has chronic right low back and right side pain and is concerned this could be coming from deep abdomen/colon    #elevated LFTs  -AST & ALT mild elevation in January 2024. Previously normal in Oct 2022. Ultrasound abdomen done 2/18: normal appearing liver & gallbladder. Pancreas partially obscured by bowel  -pancreatitis in 2016 from heavy ETOH use after honeymoon to [place]  --etiology unclear. Will order autoimmune liver labs and hepatitis panel. Recheck LFTS in 6 months.    Personal hx of liver disease or hepatitis: none   Family hx of liver disease:  none   Etoh use: none past 2.5 years  Meds, herbals, vitamins: no hepatic toxic agent identified that requires dose adjustment at this time  -med list in EPIC- see assessment portion in note  -other: takes ashwagandha supplement occasionally. Advised patient to avoid  Illicit drug use:  none   Autoimmune conditions: none   Toxin exposure at work:       Your daily meds have very rare link to liver function test elevation and liver injury but usually do not require dose adjustment or discontinuation. If in the future your liver function declines without other cause we will consider medication adjustment.   \"Finasteride has been associated with a low rate of serum aminotransferase elevations that, in controlled trials, was no higher than with placebo therapy. These elevations were transient and rarely required dose modification, and have occurred with both the 5 mg dose for prostatic hypertrophy and the 1 mg dose for hair growth. There have been published reports of transient serum enzyme elevations occurring during finasteride therapy, but none of clinically apparent liver injury.\" - Livertox.nih.gov  \"Atomoxetine has been linked to serum aminotransferase elevations in a small proportion of patients (~0.5%). More importantly, there have been several reports of clinically apparent acute  liver injury due to atomoxetine.\" - Livertox.nih.gov  \"Liver test abnormalities occur in a proportion of patients on trazodone, but elevations are usually modest and usually do not require dose modification or discontinuation. At least a dozen instances of acute, clinically apparent episodes of liver injury with marked liver enzyme elevations with or without jaundice have been reported in patients on trazodone\" - Livertox.nih.gov  \"Liver test abnormalities have been reported to occur in less than 1% of patients on bupropion, and elevations are usually modest and usually do not require dose modification or discontinuation. Rare instances of acute, clinically apparent episodes of liver injury with marked liver enzyme elevations with or without jaundice have been reported in patients on bupropion\" - Livertox.nih.gov    Recommendations:  -Schedule colonoscopy with Dr. Youssef, Dr. Marshall or Dr. Napoles   Diagnosis: family hx of colon cancer, CRC screen, right side pain  Sedation: MAC or IV  Prep: split dose suprep    -go to lab for blood work  -follow up in 6 months    Future: consider discontinuing of home meds if LFTs elevation continues and no other culprit identified    ENDOSCOPIC RISK BENEFIT DISCUSSION: I described the procedure in great detail with the patient. I discussed the risks and benefits, including but not limited to: bleeding, perforation, infection, anesthesia complications, and even death. Patient will be NPO after midnight and will have a person physically present at time of pick-up to drive patient home. Patient verbalized understanding and agrees to proceed with procedure as planned.    Orders This Visit:  Orders Placed This Encounter   Procedures    Actin (Smooth Muscle) Antibody    Hepatitis A B + C profile    Mitochondrial (M2) Antibody       Meds This Visit:  Requested Prescriptions     Signed Prescriptions Disp Refills    Na Sulfate-K Sulfate-Mg Sulf (SUPREP BOWEL PREP KIT)  17.5-3.13-1.6 GM/177ML Oral Solution 1 each 0     Sig: Take as directed by GI clinic. Okay to substitute for generic.       Imaging & Referrals:  None      DEMIAN Avila    [facility]  3/8/2024        This note was partially prepared using Dragon Medical voice recognition dictation software. As a result, errors may occur. When identified, these errors have been corrected. While every attempt is made to correct errors during dictation, discrepancies may still exist.

## 2024-03-08 NOTE — TELEPHONE ENCOUNTER
Scheduled for:  Colonoscopy 17643  Provider Name:  Dr Marshall  Date:  6/14/2024  Location:  [facility]  Sedation:  MAC  Time:  1:00 pm. (pt is aware that [facility] will call the day before to confirm arrival time)  Prep:  Split dose Suprep  Meds/Allergies Reconciled?:  DEMIAN Walker reviewed.  Diagnosis with codes:    CRC screening Z2.11  Family Hx of Colon cancer Z80.0  Right side pain M25.551  Was patient informed to call insurance with codes (Y/N):  Yes  Referral sent?:  Referral was sent at the time of electronic surgical scheduling.  EM or [facility] notified?:  I sent an electronic request to Endo Scheduling and received a confirmation today.  Medication Orders:  Patient is aware to NOT take iron pills, herbal meds and diet supplements for 7 days before exam. Also to NOT take any form of alcohol, recreational drugs and any forms of ED meds 24-72 hours before exam.   Misc Orders:  N/A   Further instructions given by staff:  I discussed the prep instructions with the patient which he verbally understood. I provided patient with prep instruction's sheet in office.      Patient was informed about the new cancellation policy for his/her procedure. Patient was also given a copy of the cancellation policy at the time of the appointment and verbalized understanding.

## 2024-03-08 NOTE — PATIENT INSTRUCTIONS
1. Schedule colonoscopy with Dr. Youssef, Dr. Marshall or Dr. Napoles   Diagnosis: family hx of colon cancer, crc screen, right side pain  Sedation: MAC or IV  Prep: split dose suprep    2.  bowel prep from pharmacy   You can pick the bowel prep up now and store in a cool, dry place in your home until your scheduled bowel prep start date.    3. Continue all medications as normal for your procedure. DO NOT TAKE: Any form of alcohol, recreational drugs and any forms of erectile dysfunction medications 24 hours prior to procedure.    4. Read all bowel prep instructions carefully. Bowel prep instructions can also be found online at:  [email or web address].org/giprep     5. AVOID seeds, nuts, popcorn, raw fruits and vegetables for 5 days before procedure    6. If you start any NEW medication after your visit today, please notify us. Certain medications (like iron or weight loss medications) will need to be held before the procedure, or the procedure cannot be performed safely.       -follow up in 6 months

## 2024-04-09 ENCOUNTER — TELEPHONE (OUTPATIENT)
Facility: CLINIC | Age: 39
End: 2024-04-09

## 2024-04-09 NOTE — TELEPHONE ENCOUNTER
[employer] message sent pending labs:    Actin (Smooth Muscle) Antibody  Hepatitis A B + C profile  Mitochondrial (M2) Antibody

## 2024-04-15 ENCOUNTER — LAB ENCOUNTER (OUTPATIENT)
Dept: LAB | Facility: HOSPITAL | Age: 39
End: 2024-04-15
Payer: COMMERCIAL

## 2024-04-15 DIAGNOSIS — R79.89 ELEVATED LFTS: ICD-10-CM

## 2024-04-15 LAB
HAV AB SER QL IA: REACTIVE
HBV CORE AB SERPL QL IA: NONREACTIVE
HBV SURFACE AB SER QL: NONREACTIVE
HBV SURFACE AB SERPL IA-ACNC: <3.1 MIU/ML
HBV SURFACE AG SERPL QL IA: NONREACTIVE
HCV AB SERPL QL IA: NONREACTIVE

## 2024-04-15 PROCEDURE — 36415 COLL VENOUS BLD VENIPUNCTURE: CPT

## 2024-04-15 PROCEDURE — 83516 IMMUNOASSAY NONANTIBODY: CPT

## 2024-04-15 PROCEDURE — 80503 PATH CLIN CONSLTJ SF 5-20: CPT

## 2024-04-15 PROCEDURE — 87340 HEPATITIS B SURFACE AG IA: CPT

## 2024-04-15 PROCEDURE — 86704 HEP B CORE ANTIBODY TOTAL: CPT

## 2024-04-15 PROCEDURE — 86708 HEPATITIS A ANTIBODY: CPT

## 2024-04-15 PROCEDURE — 86803 HEPATITIS C AB TEST: CPT

## 2024-04-15 PROCEDURE — 86706 HEP B SURFACE ANTIBODY: CPT

## 2024-04-15 PROCEDURE — 86709 HEPATITIS A IGM ANTIBODY: CPT

## 2024-04-16 LAB — HAV IGM SER QL: NONREACTIVE

## 2024-04-17 LAB
ACTIN SMOOTH MUSCLE AB: 8 UNITS
M2 MITOCHONDRIAL AB: <20 UNITS

## 2024-05-22 ENCOUNTER — TELEPHONE (OUTPATIENT)
Facility: CLINIC | Age: 39
End: 2024-05-22

## 2024-05-22 DIAGNOSIS — Z80.0 FAMILY HISTORY OF MALIGNANT NEOPLASM OF GASTROINTESTINAL TRACT: ICD-10-CM

## 2024-05-22 DIAGNOSIS — Z12.11 SPECIAL SCREENING FOR MALIGNANT NEOPLASMS, COLON: Primary | ICD-10-CM

## 2024-05-22 DIAGNOSIS — M25.551 RIGHT HIP PAIN: ICD-10-CM

## 2024-05-23 NOTE — TELEPHONE ENCOUNTER
Rescheduled for:  Colonoscopy [id number]  Provider Name:  Dr Marshall  Date: FROM 6/14/2024 To 10/4/2024  Location:  [facility]  Sedation:  MAC  Time: FROM 1:00 pm. To 10:30am (pt is aware that [facility] will call the day before to confirm arrival time)  Prep:  Split dose Suprep  Meds/Allergies Reconciled?:  DEMIAN Walker reviewed.  Diagnosis with codes:    CRC screening Z2.11  Family Hx of Colon cancer Z80.0  Right side pain M25.551  Was patient informed to call insurance with codes (Y/N):  Yes  Referral sent?:  Referral was sent at the time of electronic surgical scheduling.  [facility] or [facility] notified?:  I sent an electronic request to Endo Scheduling and received a confirmation today.  Medication Orders:  Patient is aware to NOT take iron pills, herbal meds and diet supplements for 7 days before exam. Also to NOT take any form of alcohol, recreational drugs and any forms of ED meds 24-72 hours before exam.   Misc Orders:  N/A   Further instructions given by staff:  I discussed the prep instructions with the patient which he verbally understood. I provided patient with prep instruction's sheet in office.

## 2024-05-23 NOTE — TELEPHONE ENCOUNTER
PPD-    Patient colonoscopy [id number] is now scheduled on 10/4/2024 with Dr. Marshall at [facility], dx code: CRC screening Z2.11  Family Hx of Colon cancer Z80.0  Right side pain M25.551    Thanks!

## 2024-06-05 ENCOUNTER — OFFICE VISIT (OUTPATIENT)
Dept: PHYSICAL MEDICINE AND REHAB | Facility: CLINIC | Age: 39
End: 2024-06-05
Payer: COMMERCIAL

## 2024-06-05 ENCOUNTER — TELEPHONE (OUTPATIENT)
Dept: PHYSICAL MEDICINE AND REHAB | Facility: CLINIC | Age: 39
End: 2024-06-05

## 2024-06-05 VITALS — WEIGHT: 180 LBS | BODY MASS INDEX: 25.2 KG/M2 | HEIGHT: 71 IN

## 2024-06-05 DIAGNOSIS — M75.51 SUBACROMIAL BURSITIS OF RIGHT SHOULDER JOINT: ICD-10-CM

## 2024-06-05 DIAGNOSIS — M19.011 OSTEOARTHRITIS OF RIGHT GLENOHUMERAL JOINT: ICD-10-CM

## 2024-06-05 DIAGNOSIS — F98.8 ATTENTION DEFICIT DISORDER, UNSPECIFIED HYPERACTIVITY PRESENCE: ICD-10-CM

## 2024-06-05 DIAGNOSIS — M75.21 BICEPS TENDONITIS ON RIGHT: ICD-10-CM

## 2024-06-05 DIAGNOSIS — M67.919 TENDINOPATHY OF ROTATOR CUFF, UNSPECIFIED LATERALITY: ICD-10-CM

## 2024-06-05 DIAGNOSIS — G25.89 SCAPULAR DYSKINESIS: ICD-10-CM

## 2024-06-05 DIAGNOSIS — M25.511 ACUTE PAIN OF RIGHT SHOULDER: Primary | ICD-10-CM

## 2024-06-05 DIAGNOSIS — M75.41 SUBACROMIAL IMPINGEMENT OF RIGHT SHOULDER: ICD-10-CM

## 2024-06-05 DIAGNOSIS — F41.9 ANXIETY: ICD-10-CM

## 2024-06-05 PROCEDURE — 99214 OFFICE O/P EST MOD 30 MIN: CPT | Performed by: PHYSICAL MEDICINE & REHABILITATION

## 2024-06-05 PROCEDURE — 3008F BODY MASS INDEX DOCD: CPT | Performed by: PHYSICAL MEDICINE & REHABILITATION
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
## 2024-06-05 NOTE — PATIENT INSTRUCTIONS
1) My office will call you to schedule the RIGHT GH Csi under ultrasound guidance once the procedure is approved by your insurance carrier.    2) My office will call you to schedule the RIGHT biceps tendon sheath CSI under ultrasound guidance once the procedure is approved by your insurance carrier.    3) Tylenol 500-1000 mg every 6-8 hours as needed for pain.  No more than 3000 mg daily.  4)  Ice 20 minutes at a time 3-4 times per day  5) Start tumeric with black pepper 1000 mg twice per day  6) Take Glucosamine with chondroitin 1500/1200 mg daily.   7) Please begin physical therapy as soon as possible.   8) Lets plan on injection for 7/1 and 7/8 at 12:40 in [place] office.

## 2024-06-05 NOTE — TELEPHONE ENCOUNTER
Initiated authorization for RIGHT Glenohumeral Corticosteroid Injection under ultrasound guidance [facility] [id number],  with Availity  Status: Approved-authorization is not required per health plan based on medical necessity however is not a guarantee of payment and may be subject to review once claim is submitted valid 6/5/24-9/5/24        AND    Initiated authorization for RIGHT biceps tendon sheath CSI under ultrasound guidance [facility] [id number], , [id number] with Availity  Status: Approved-authorization is not required per health plan based on medical necessity however is not a guarantee of payment and may be subject to review once claim is submitted valid 6/5/24-9/5/24

## 2024-07-01 ENCOUNTER — OFFICE VISIT (OUTPATIENT)
Dept: PHYSICAL MEDICINE AND REHAB | Facility: CLINIC | Age: 39
End: 2024-07-01
Payer: COMMERCIAL

## 2024-07-01 DIAGNOSIS — M19.011 OSTEOARTHRITIS OF RIGHT GLENOHUMERAL JOINT: ICD-10-CM

## 2024-07-01 DIAGNOSIS — M75.41 SUBACROMIAL IMPINGEMENT OF RIGHT SHOULDER: ICD-10-CM

## 2024-07-01 DIAGNOSIS — M75.51 SUBACROMIAL BURSITIS OF RIGHT SHOULDER JOINT: ICD-10-CM

## 2024-07-01 DIAGNOSIS — M67.919 TENDINOPATHY OF ROTATOR CUFF, UNSPECIFIED LATERALITY: ICD-10-CM

## 2024-07-01 DIAGNOSIS — M25.511 ACUTE PAIN OF RIGHT SHOULDER: Primary | ICD-10-CM

## 2024-07-01 NOTE — PROCEDURES
[facility]   Shoulder Injection Procedure Note    CHIEF COMPLAINT:    Chief Complaint   Patient presents with    Injection     Pt here for RIGHT bicep tendon sheath CSI. CPL: 8/10.       PROCEDURE PERFORMED:  Right  biceps tendon sheath   corticosteroid injection under ultrasound guidance    INDICATIONS:    shoulder pain    PRIMARY PROCEDURALIST:  Alex Behar, MD    INFORMED CONSENT & TIME OUT:   As documented in the Time Out and Pre-Procedure Check Lists.  Verbal consent was obtained    Vitals: [unfilled]  Labs (document last wbc, plts, hgb, and PT/INR):   [facility] Lab Encounter on 04/15/2024   Component Date Value Ref Range Status    Actin Smooth Muscle Ab 04/15/2024 8  0 - 19 Units Final    Hepatitis A Virus Ab, Total 04/15/2024 Reactive (A)  Nonreactive  Final    Hepatitis B Surface Antigen 04/15/2024 Nonreactive  Nonreactive  Final    Hepatitis B Core Ab,Total 04/15/2024 Nonreactive  Nonreactive  Final    Hepatitis B Surface Antibody 04/15/2024 Nonreactive (A)  Reactive  Final    Heptatitis B Surface Ab Quant 04/15/2024 <3.10  mIU/mL Final    Hepatitis C Virus 04/15/2024 Nonreactive  Nonreactive  Final    M2 Mitochondrial Ab 04/15/2024 <20.0  0.0 - 20.0 Units Final    Hepatitis A Ab, IgM 04/15/2024 Nonreactive  Nonreactive  Final   [facility] Lab Encounter on 01/26/2024   Component Date Value Ref Range Status    AST 01/26/2024 45 (H)  <=34 U/L Final    ALT 01/26/2024 63 (H)  10 - 49 U/L Final    Alkaline Phosphatase 01/26/2024 91  45 - 117 U/L Final    Bilirubin, Total 01/26/2024 0.6  0.3 - 1.2 mg/dL Final    Bilirubin, Direct 01/26/2024 0.2  <=0.3 mg/dL Final    Total Protein 01/26/2024 7.3  5.7 - 8.2 g/dL Final    Albumin 01/26/2024 4.6  3.2 - 4.8 g/dL Final   [facility] EKG Encounter on 01/23/2024   Component Date Value Ref Range Status    Ventricular rate 01/23/2024 73  BPM Final    Atrial rate 01/23/2024 73  BPM Final    P-R Interval 01/23/2024 152  ms Final    QRS Duration  01/23/2024 94  ms Final    Q-T Interval 01/23/2024 392  ms Final    QTC Calculation (Bezet) 01/23/2024 431  ms Final    P Axis 01/23/2024 54  degrees Final    R Axis 01/23/2024 6  degrees Final    T Axis 01/23/2024 52  degrees Final   [facility] Lab Encounter on 01/23/2024   Component Date Value Ref Range Status    Glucose 01/23/2024 94  70 - 99 mg/dL Final    Sodium 01/23/2024 138  136 - 145 mmol/L Final    Potassium 01/23/2024 4.6  3.5 - 5.1 mmol/L Final    Chloride 01/23/2024 105  98 - 112 mmol/L Final    CO2 01/23/2024 29.0  21.0 - 32.0 mmol/L Final    Anion Gap 01/23/2024 4  0 - 18 mmol/L Final    BUN 01/23/2024 15  9 - 23 mg/dL Final    Creatinine 01/23/2024 0.96  0.70 - 1.30 mg/dL Final    BUN/CREA Ratio 01/23/2024 15.6  10.0 - 20.0 Final    Calcium, Total 01/23/2024 9.5  8.7 - 10.4 mg/dL Final    Calculated Osmolality 01/23/2024 287  275 - 295 mOsm/kg Final    eGFR-Cr 01/23/2024 102  >=60 mL/min/1.73m2 Final    ALT 01/23/2024 63 (H)  10 - 49 U/L Final    AST 01/23/2024 41 (H)  <=34 U/L Final    Alkaline Phosphatase 01/23/2024 81  45 - 117 U/L Final    Bilirubin, Total 01/23/2024 0.7  0.3 - 1.2 mg/dL Final    Total Protein 01/23/2024 6.3  5.7 - 8.2 g/dL Final    Albumin 01/23/2024 4.3  3.2 - 4.8 g/dL Final    Globulin  01/23/2024 2.0 (L)  2.8 - 4.4 g/dL Final    A/G Ratio 01/23/2024 2.2 (H)  1.0 - 2.0 Final    Patient Fasting for CMP? 01/23/2024 Yes   Final    Cholesterol, Total 01/23/2024 157  <200 mg/dL Final    HDL Cholesterol 01/23/2024 69 (H)  40 - 59 mg/dL Final    Triglycerides 01/23/2024 42  30 - 149 mg/dL Final    LDL Cholesterol 01/23/2024 79  <100 mg/dL Final    VLDL 01/23/2024 7  0 - 30 mg/dL Final    Non HDL Chol 01/23/2024 88  <130 mg/dL Final    Patient Fasting for Lipid? 01/23/2024 Yes   Final    TSH 01/23/2024 2.231  0.550 - 4.780 mIU/mL Final    Urine Color 01/23/2024 Yellow  Yellow Final    Clarity Urine 01/23/2024 Clear  Clear Final    Spec Gravity 01/23/2024 1.026  1.005 - 1.030 Final    Glucose  Urine 01/23/2024 Normal  Normal mg/dL Final    Bilirubin Urine 01/23/2024 Negative  Negative Final    Ketones Urine 01/23/2024 Negative  Negative mg/dL Final    Blood Urine 01/23/2024 Negative  Negative Final    pH Urine 01/23/2024 8.0  5.0 - 8.0 Final    Protein Urine 01/23/2024 Negative  Negative mg/dL Final    Urobilinogen Urine 01/23/2024 Normal  Normal Final    Nitrite Urine 01/23/2024 Negative  Negative Final    Leukocyte Esterase Urine 01/23/2024 Negative  Negative Final    Microscopic 01/23/2024 Microscopic not indicated   Final    Prostate Specific Antigen Screen  01/23/2024 0.33  <=4.00 Final    WBC 01/23/2024 5.3  4.0 - 11.0 x10(3) uL Final    RBC 01/23/2024 5.03  4.30 - 5.70 x10(6)uL Final    HGB 01/23/2024 14.5  13.0 - 17.5 g/dL Final    HCT 01/23/2024 43.6  39.0 - 53.0 % Final    MCV 01/23/2024 86.7  80.0 - 100.0 fL Final    MCH 01/23/2024 28.8  26.0 - 34.0 pg Final    MCHC 01/23/2024 33.3  31.0 - 37.0 g/dL Final    RDW-SD 01/23/2024 37.2  35.1 - 46.3 fL Final    RDW 01/23/2024 11.8  11.0 - 15.0 % Final    PLT 01/23/2024 193.0  150.0 - 450.0 10(3)uL Final    Neutrophil Absolute Prelim 01/23/2024 2.79  1.50 - 7.70 x10 (3) uL Final    Neutrophil Absolute 01/23/2024 2.79  1.50 - 7.70 x10(3) uL Final    Lymphocyte Absolute 01/23/2024 1.78  1.00 - 4.00 x10(3) uL Final    Monocyte Absolute 01/23/2024 0.58  0.10 - 1.00 x10(3) uL Final    Eosinophil Absolute 01/23/2024 0.12  0.00 - 0.70 x10(3) uL Final    Basophil Absolute 01/23/2024 0.03  0.00 - 0.20 x10(3) uL Final    Immature Granulocyte Absolute 01/23/2024 0.02  0.00 - 1.00 x10(3) uL Final    Neutrophil % 01/23/2024 52.3  % Final    Lymphocyte % 01/23/2024 33.5  % Final    Monocyte % 01/23/2024 10.9  % Final    Eosinophil % 01/23/2024 2.3  % Final    Basophil % 01/23/2024 0.6  % Final    Immature Granulocyte % 01/23/2024 0.4  % Final   ]    PROCEDURE:    The risks and benefits of intraarticular corticosteroid injection were again explained to the patient  and verbal consent was obtained. The   shoulder was prepped and draped in the usual sterile fashion. Using a linear ultrasound transducer, the    biceps tendon sheath  was identified. A 25 G 1.5-inch needle was introduced into the shoulder while injecting 2 cc of 1% lidocain under ultrasound guidance. The needle was seen in the biceps tendon sheath. Aspiration was attempted and there was no fluid able to be aspirated. We switched the syringe to one containing 2 mL of 1% lidocaine and 1 mL of 40 mg/mL Kenalog and it was injected.  The needle was then removed and hemostasis was obtained.  The skin site was cleansed and a clean dressing was applied.  The patient tolerated the procedure well.     Patient verbalized understanding of assessment and plan.  Patient is in agreement with the plan.  All questions were answered.  No barriers to learning identified. Permanent pictures were saved.       INSTRUCTIONS GIVEN TO PATIENT:    \"You will see an effect in the next 2-3 days.  Please contact me if you have fevers, worsening swelling, worsening pain, decreased range of motion, increased redness, chills, or anything that makes you concerned about how the joint we injected feels/looks.  If you do not reach me in a reasonable time, please report directly to the emergency room for further evaluation\"        Alex B. Behar MD, [facility]  Physical Medicine and Rehabilitation/Sports Medicine  [facility]

## 2024-07-01 NOTE — PATIENT INSTRUCTIONS
1) Please call and schedule your MRI of the RIGHT shoulder at [phone].  Once you have your MRI scheduled, then call my office again to schedule a follow-up visit soon after your MRI so we may review the images together.  2) Make an appointment orthopedic surgery (Dr. Workman, Dr. West, or Dr. Martines)  3) Tylenol 500-1000 mg every 6-8 hours as needed for pain.  No more than 3000 mg daily.  4) See you next week      Post Injection Instructions     Please do not do anything strenuous over the next two days (if you had a knee injection do not walk more than 2 city blocks, do not attend any aerobic classes, do not run, no heavy lifting, no prolong standing).  You may resume your day to day activities after your injection.  You may experience some mild amount of swelling after the procedure.  Please ice your joint that was injected at least 5-6 times a day (15 minutes) for two days after (this will help prevent worsening pain that sometimes occurs after an injection).  Only take tylenol if needed for pain for the first few days.  Watch for signs of infection which include redness, warmth, worsening pain, fevers or chills.  If you develop any of these signs call the office immediately at [phone]    Everyone responds differently to injections, but you can expect your peak effects a few weeks after your last injection.  Alex B. Behar MD  Physical Medicine and Rehabilitation/Sports Medicine  [facility]

## 2024-07-08 ENCOUNTER — OFFICE VISIT (OUTPATIENT)
Dept: PHYSICAL MEDICINE AND REHAB | Facility: CLINIC | Age: 39
End: 2024-07-08
Payer: COMMERCIAL

## 2024-07-08 DIAGNOSIS — M75.51 SUBACROMIAL BURSITIS OF RIGHT SHOULDER JOINT: ICD-10-CM

## 2024-07-08 DIAGNOSIS — M75.41 SUBACROMIAL IMPINGEMENT OF RIGHT SHOULDER: ICD-10-CM

## 2024-07-08 DIAGNOSIS — M19.011 OSTEOARTHRITIS OF RIGHT GLENOHUMERAL JOINT: ICD-10-CM

## 2024-07-08 DIAGNOSIS — M75.21 BICEPS TENDONITIS ON RIGHT: Primary | ICD-10-CM

## 2024-07-08 DIAGNOSIS — M25.511 ACUTE PAIN OF RIGHT SHOULDER: ICD-10-CM

## 2024-07-08 RX ORDER — TRIAMCINOLONE ACETONIDE 40 MG/ML
40 INJECTION, SUSPENSION INTRA-ARTICULAR; INTRAMUSCULAR ONCE
Status: COMPLETED | OUTPATIENT
Start: 2024-07-08 | End: 2024-07-08

## 2024-07-08 RX ORDER — LIDOCAINE HYDROCHLORIDE 10 MG/ML
9 INJECTION, SOLUTION INFILTRATION; PERINEURAL ONCE
Status: COMPLETED | OUTPATIENT
Start: 2024-07-08 | End: 2024-07-08

## 2024-07-08 NOTE — PROCEDURES
[facility]   Shoulder Injection Procedure Note    CHIEF COMPLAINT:    Chief Complaint   Patient presents with    Injection     LOV 7/1/24. Patient presents for RIGHT GH Csi under ultrasound guidance.       PROCEDURE PERFORMED:  Right glenohumeral joint  corticosteroid injection under ultrasound guidance    INDICATIONS:  Right shoulder pain    PRIMARY PROCEDURALIST:  Alex Behar, MD    INFORMED CONSENT & TIME OUT:   As documented in the Time Out and Pre-Procedure Check Lists.  Verbal consent was obtained    Vitals: [unfilled]  Labs (document last wbc, plts, hgb, and PT/INR):   [facility] Lab Encounter on 04/15/2024   Component Date Value Ref Range Status    Actin Smooth Muscle Ab 04/15/2024 8  0 - 19 Units Final    Hepatitis A Virus Ab, Total 04/15/2024 Reactive (A)  Nonreactive  Final    Hepatitis B Surface Antigen 04/15/2024 Nonreactive  Nonreactive  Final    Hepatitis B Core Ab,Total 04/15/2024 Nonreactive  Nonreactive  Final    Hepatitis B Surface Antibody 04/15/2024 Nonreactive (A)  Reactive  Final    Heptatitis B Surface Ab Quant 04/15/2024 <3.10  mIU/mL Final    Hepatitis C Virus 04/15/2024 Nonreactive  Nonreactive  Final    M2 Mitochondrial Ab 04/15/2024 <20.0  0.0 - 20.0 Units Final    Hepatitis A Ab, IgM 04/15/2024 Nonreactive  Nonreactive  Final   [facility] Lab Encounter on 01/26/2024   Component Date Value Ref Range Status    AST 01/26/2024 45 (H)  <=34 U/L Final    ALT 01/26/2024 63 (H)  10 - 49 U/L Final    Alkaline Phosphatase 01/26/2024 91  45 - 117 U/L Final    Bilirubin, Total 01/26/2024 0.6  0.3 - 1.2 mg/dL Final    Bilirubin, Direct 01/26/2024 0.2  <=0.3 mg/dL Final    Total Protein 01/26/2024 7.3  5.7 - 8.2 g/dL Final    Albumin 01/26/2024 4.6  3.2 - 4.8 g/dL Final   [facility] EKG Encounter on 01/23/2024   Component Date Value Ref Range Status    Ventricular rate 01/23/2024 73  BPM Final    Atrial rate 01/23/2024 73  BPM Final    P-R Interval 01/23/2024 152  ms Final     QRS Duration 01/23/2024 94  ms Final    Q-T Interval 01/23/2024 392  ms Final    QTC Calculation (Bezet) 01/23/2024 431  ms Final    P Axis 01/23/2024 54  degrees Final    R Axis 01/23/2024 6  degrees Final    T Axis 01/23/2024 52  degrees Final   [facility] Lab Encounter on 01/23/2024   Component Date Value Ref Range Status    Glucose 01/23/2024 94  70 - 99 mg/dL Final    Sodium 01/23/2024 138  136 - 145 mmol/L Final    Potassium 01/23/2024 4.6  3.5 - 5.1 mmol/L Final    Chloride 01/23/2024 105  98 - 112 mmol/L Final    CO2 01/23/2024 29.0  21.0 - 32.0 mmol/L Final    Anion Gap 01/23/2024 4  0 - 18 mmol/L Final    BUN 01/23/2024 15  9 - 23 mg/dL Final    Creatinine 01/23/2024 0.96  0.70 - 1.30 mg/dL Final    BUN/CREA Ratio 01/23/2024 15.6  10.0 - 20.0 Final    Calcium, Total 01/23/2024 9.5  8.7 - 10.4 mg/dL Final    Calculated Osmolality 01/23/2024 287  275 - 295 mOsm/kg Final    eGFR-Cr 01/23/2024 102  >=60 mL/min/1.73m2 Final    ALT 01/23/2024 63 (H)  10 - 49 U/L Final    AST 01/23/2024 41 (H)  <=34 U/L Final    Alkaline Phosphatase 01/23/2024 81  45 - 117 U/L Final    Bilirubin, Total 01/23/2024 0.7  0.3 - 1.2 mg/dL Final    Total Protein 01/23/2024 6.3  5.7 - 8.2 g/dL Final    Albumin 01/23/2024 4.3  3.2 - 4.8 g/dL Final    Globulin  01/23/2024 2.0 (L)  2.8 - 4.4 g/dL Final    A/G Ratio 01/23/2024 2.2 (H)  1.0 - 2.0 Final    Patient Fasting for CMP? 01/23/2024 Yes   Final    Cholesterol, Total 01/23/2024 157  <200 mg/dL Final    HDL Cholesterol 01/23/2024 69 (H)  40 - 59 mg/dL Final    Triglycerides 01/23/2024 42  30 - 149 mg/dL Final    LDL Cholesterol 01/23/2024 79  <100 mg/dL Final    VLDL 01/23/2024 7  0 - 30 mg/dL Final    Non HDL Chol 01/23/2024 88  <130 mg/dL Final    Patient Fasting for Lipid? 01/23/2024 Yes   Final    TSH 01/23/2024 2.231  0.550 - 4.780 mIU/mL Final    Urine Color 01/23/2024 Yellow  Yellow Final    Clarity Urine 01/23/2024 Clear  Clear Final    Spec Gravity 01/23/2024 1.026  1.005 - 1.030  Final    Glucose Urine 01/23/2024 Normal  Normal mg/dL Final    Bilirubin Urine 01/23/2024 Negative  Negative Final    Ketones Urine 01/23/2024 Negative  Negative mg/dL Final    Blood Urine 01/23/2024 Negative  Negative Final    pH Urine 01/23/2024 8.0  5.0 - 8.0 Final    Protein Urine 01/23/2024 Negative  Negative mg/dL Final    Urobilinogen Urine 01/23/2024 Normal  Normal Final    Nitrite Urine 01/23/2024 Negative  Negative Final    Leukocyte Esterase Urine 01/23/2024 Negative  Negative Final    Microscopic 01/23/2024 Microscopic not indicated   Final    Prostate Specific Antigen Screen  01/23/2024 0.33  <=4.00 Final    WBC 01/23/2024 5.3  4.0 - 11.0 x10(3) uL Final    RBC 01/23/2024 5.03  4.30 - 5.70 x10(6)uL Final    HGB 01/23/2024 14.5  13.0 - 17.5 g/dL Final    HCT 01/23/2024 43.6  39.0 - 53.0 % Final    MCV 01/23/2024 86.7  80.0 - 100.0 fL Final    MCH 01/23/2024 28.8  26.0 - 34.0 pg Final    MCHC 01/23/2024 33.3  31.0 - 37.0 g/dL Final    RDW-SD 01/23/2024 37.2  35.1 - 46.3 fL Final    RDW 01/23/2024 11.8  11.0 - 15.0 % Final    PLT 01/23/2024 193.0  150.0 - 450.0 10(3)uL Final    Neutrophil Absolute Prelim 01/23/2024 2.79  1.50 - 7.70 x10 (3) uL Final    Neutrophil Absolute 01/23/2024 2.79  1.50 - 7.70 x10(3) uL Final    Lymphocyte Absolute 01/23/2024 1.78  1.00 - 4.00 x10(3) uL Final    Monocyte Absolute 01/23/2024 0.58  0.10 - 1.00 x10(3) uL Final    Eosinophil Absolute 01/23/2024 0.12  0.00 - 0.70 x10(3) uL Final    Basophil Absolute 01/23/2024 0.03  0.00 - 0.20 x10(3) uL Final    Immature Granulocyte Absolute 01/23/2024 0.02  0.00 - 1.00 x10(3) uL Final    Neutrophil % 01/23/2024 52.3  % Final    Lymphocyte % 01/23/2024 33.5  % Final    Monocyte % 01/23/2024 10.9  % Final    Eosinophil % 01/23/2024 2.3  % Final    Basophil % 01/23/2024 0.6  % Final    Immature Granulocyte % 01/23/2024 0.4  % Final   ]    PROCEDURE:    The risks and benefits of intraarticular corticosteroid injection were again explained  to the patient and verbal consent was obtained. The Right shoulder was prepped and draped in the usual sterile fashion. Using a linear ultrasound transducer, the Right glenohumeral joint was identified. A 25 G 1.5-inch needle was introduced into the shoulder while injecting 4 cc of 1% lidocain under ultrasound guidance. The needle was seen in the glenohumeral space. Aspiration was attempted and there was no fluid able to be aspirated. We switched the syringe to one containing 4 mL of 1% lidocaine and 1 mL of 40 mg/mL Kenalog and it was injected.  The needle was then removed and hemostasis was obtained.  The skin site was cleansed and a clean dressing was applied.  The patient tolerated the procedure well.     Patient verbalized understanding of assessment and plan.  Patient is in agreement with the plan.  All questions were answered.  No barriers to learning identified. Permanent pictures were saved.       INSTRUCTIONS GIVEN TO PATIENT:    \"You will see an effect in the next 2-3 days.  Please contact me if you have fevers, worsening swelling, worsening pain, decreased range of motion, increased redness, chills, or anything that makes you concerned about how the joint we injected feels/looks.  If you do not reach me in a reasonable time, please report directly to the emergency room for further evaluation\"    PRN Alex B. Behar MD, [facility]  Physical Medicine and Rehabilitation/Sports Medicine  [facility]

## 2024-07-08 NOTE — PATIENT INSTRUCTIONS
Post Injection Instructions     Please do not do anything strenuous over the next two days (if you had a knee injection do not walk more than 2 city blocks, do not attend any aerobic classes, do not run, no heavy lifting, no prolong standing).  You may resume your day to day activities after your injection.  You may experience some mild amount of swelling after the procedure.  Please ice your joint that was injected at least 5-6 times a day (15 minutes) for two days after (this will help prevent worsening pain that sometimes occurs after an injection).  Only take tylenol if needed for pain for the first few days.  Watch for signs of infection which include redness, warmth, worsening pain, fevers or chills.  If you develop any of these signs call the office immediately at [phone]    Everyone responds differently to injections, but you can expect your peak effects a few weeks after your last injection.  Alex B. Behar MD  Physical Medicine and Rehabilitation/Sports Medicine  [facility]

## 2024-07-12 ENCOUNTER — OFFICE VISIT (OUTPATIENT)
Dept: SURGERY | Facility: CLINIC | Age: 39
End: 2024-07-12

## 2024-07-12 VITALS — HEIGHT: 71 IN | WEIGHT: 180 LBS | BODY MASS INDEX: 25.2 KG/M2

## 2024-07-12 DIAGNOSIS — Z30.09 FAMILY PLANNING: Primary | ICD-10-CM

## 2024-07-12 PROCEDURE — 99243 OFF/OP CNSLTJ NEW/EST LOW 30: CPT | Performed by: UROLOGY

## 2024-07-12 PROCEDURE — 3008F BODY MASS INDEX DOCD: CPT | Performed by: UROLOGY

## 2024-07-12 NOTE — PROGRESS NOTES
Gaby Garcia MD  Department of Urology  [address] [place] [address]    T: [phone]  F: [phone]    To: Mariano Villa MD   [address] [address]    Re: Jim Cat   MRN: [id number]  : 1985    Dear Mariano Villa MD,    Today I had the pleasure of seeing Jim Cat in my clinic. As you know, Mr. Cat is a pleasant 38 year old year old male who I am seeing for vasectomy consult. Patient was last seen in this department on Visit date not found.    Briefly, patient is  with children and desires vasectomy as a means of contraception.        PAST MEDICAL HISTORY:  Past Medical History:    ADD (attention deficit disorder)    Anxiety    Male pattern alopecia        PAST SURGICAL HISTORY:  No past surgical history on file.      ALLERGIES:  No Known Allergies      MEDICATIONS:  Current Outpatient Medications   Medication Instructions    Atomoxetine HCl 60 MG Oral Cap No dose, route, or frequency recorded.    buPROPion ER (WELLBUTRIN XL) 300 mg, Oral, Daily    finasteride (PROPECIA) 1 mg, Oral, Daily    Misc Natural Products (SUPER GREENS OR) Oral    multivitamin Oral Chew Tab 1 tablet, Oral, Daily    Na Sulfate-K Sulfate-Mg Sulf (SUPREP BOWEL PREP KIT) 17.5-3.13-1.6 GM/177ML Oral Solution Take as directed by GI clinic. Okay to substitute for generic.    Omega-3 Fatty Acids (FISH OIL) 300 MG Oral Cap Oral    psyllium 28 % Oral Powd Pack 1 packet, Oral, 2 times daily    traZODone (DESYREL) 150 mg, Oral, As needed        FAMILY HISTORY:  Family History   Problem Relation Age of Onset    High Blood Pressure Mother     Prostate Cancer Father     No Known Problems Brother     Colon Cancer Maternal Grandfather     Heart Disorder Maternal Grandfather         SOCIAL HISTORY:  Social History     Socioeconomic History    Marital status:    Tobacco Use    Smoking status: Former     Current packs/day: 0.00     Types: Cigarettes     Quit date:       Years since quittin.5    Smokeless tobacco: Never   Vaping Use    Vaping status: Never Used   Substance and Sexual Activity    Alcohol use: Not Currently     Comment: none past 2.5 years, former heavy use    Drug use: Never     Social Determinants of Health      Received [facility], [facility]    Social Connections    Received from [facility], [facility]    Housing Stability          PHYSICAL EXAMINATION:  There were no vitals filed for this visit.  CONSTITUTIONAL: No apparent distress, cooperative and communicative  NEUROLOGIC: Alert and oriented   HEAD: Normocephalic, atraumatic   EYES: Sclera non-icteric   ENT: Hearing intact, moist mucous membranes   NECK: No obvious goiter or masses   RESPIRATORY: Normal respiratory effort, Nonlabored breathing on room air  SKIN: No evident rashes   ABDOMEN: Soft, nontender, nondistended, no rebound tenderness, no guarding, no masses  GENITOURINARY: bilateral vasa palpated   DIGITAL RECTAL EXAM: did not perform    REVIEW OF SYSTEMS:    A comprehensive 10-point review of systems was completed.  Pertinent positives and negatives are noted in the the HPI.       LABORATORY DATA:  None relevant        IMAGING REVIEW:  None relevant     OTHER RELEVANT DATA:   none     IMPRESSION: vasectomy, proceed    We discussed that a vasectomy is intended to be a permanent form of contraception and that if he desires fertility after vasectomy, options included vasectomy reversal and sperm retrieval with possible in vitro fertilization. These options are not always successful and may be very expensive.    We also discussed the risks of vasectomy which included symptomatic hematoma and infection (1-2%), chronic scrotal pain (6%; caused by congestive epididymitis, sperm granuloma and infective epididymoorchitis) need for repeat vasectomy (<1% of cases), need for additional procedures, vasectomy failure, and  pregnancy (1 in 2000 men). The chronic scrotal discomfort may be no more than a low-grade chronic ache that causes little disability and requires only symptomatic treatment. However, a proportion of patients will be sufficiently debilitated to seek epididymectomy or even orchidectomy (removal of the epididymis and or testicle). Furthermore, for a very small number of patients, even this radical surgery will not provide relief from their scrotal discomfort.     We discussed that he will have two small incisions in his scrotum with dissolvable sutures. He was told that vasectomy does NOT produce immediate sterility and that following vasectomy, another form of contraception is required until vas occlusion is confirmed by post vasectomy semen analysis. Post vasectomy semenanalysis will be obtained 12-16 weeks after the vasectomy. He was told that he may stop using other methods of contraception when examination of one well-mixed, uncentrifuged, fresh post-vasectomy semen specimen shows azoospermia or only rare non-motile sperm. He was once again told that even after vas occlusion is confirmed, vasectomy is not 100% reliable in preventing pregnancy and that the risk of pregnancy after vasectomy is approximately 1 in 2,000 men who have post-vasectomy azoospermia or semenanalysis showing rare non-motile sperm. The reasons are very early recanalization of the vas deferens or the presence of an accessory vas unrecognized at the time of surgery. There have also been cases of DNA-confirmed paternity despite documented azoospermia before and after conception. He was told to refrain from ejaculation for approximately one week after vasectomy. If there are >100,000 non-motile sperm/mL after 6 months post vasectomy, we would trend the semenanalysis for consideration of repeat vasectomy.       PLAN:  vasectomy    Thank you for referring this very pleasant patient to my clinic. If you have any questions or concerns, please do not  hesitate to contact me.    Sincerely,  Gaby Garcia MD    30 minutes were spent on this patient at this visit obtaining a history, reviewing medical records, developing a treatment plan, counseling and discussing treatment strategy with patient, coordination of care and documentation.     The 21st Century Cures Act makes medical notes available to patients in the interest of transparency.  However, please be advised that this is a medical document.  It is intended as a peer to peer communication.  It is written in medical language and may contain abbreviations or verbiage that are technical and unfamiliar.  It may appear blunt or direct.  Medical documents are intended to carry relevant information, facts as evident, and the clinical opinion of the practitioner.

## 2024-08-01 ENCOUNTER — OFFICE VISIT (OUTPATIENT)
Dept: ORTHOPEDICS CLINIC | Facility: CLINIC | Age: 39
End: 2024-08-01

## 2024-08-01 ENCOUNTER — HOSPITAL ENCOUNTER (OUTPATIENT)
Dept: GENERAL RADIOLOGY | Facility: HOSPITAL | Age: 39
Discharge: HOME | End: 2024-08-01
Attending: ORTHOPAEDIC SURGERY
Payer: COMMERCIAL

## 2024-08-01 DIAGNOSIS — M25.519 SHOULDER PAIN, UNSPECIFIED CHRONICITY, UNSPECIFIED LATERALITY: ICD-10-CM

## 2024-08-01 DIAGNOSIS — M19.011 GLENOHUMERAL ARTHRITIS, RIGHT: Primary | ICD-10-CM

## 2024-08-01 PROCEDURE — 73030 X-RAY EXAM OF SHOULDER: CPT | Performed by: ORTHOPAEDIC SURGERY

## 2024-08-01 PROCEDURE — 99244 OFF/OP CNSLTJ NEW/EST MOD 40: CPT | Performed by: ORTHOPAEDIC SURGERY

## 2024-08-01 NOTE — PROGRESS NOTES
NURSING INTAKE COMMENTS:   Chief Complaint   Patient presents with    Shoulder Pain     Consult- R shoulder- onset- 3 yrs ago- had dislocated R shoulder 20 yrs ago but no recent- rates pain 5-9/10 most of the time - stated had cortisone inj w/ Dr. Behar on 7/08/2024- has scheduled MRI on 8/28/2024       HPI: This 38 year old male presents today with complaints of right shoulder pain.  He had a dislocation of his shoulder back in college.  He has developed progressive pain in the right shoulder over the course of the last 3 years.  Does a lot of exercise including Orange theory and core power yoga and running.  There is been no specific injury to the shoulder recently.  An MRI of the shoulder was performed 3 to 4 years ago.  He has pain with overhead positioning of the arm.  He has no significant night pain.  No mechanical clicking or locking.  He does notice loss of motion.  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    Past Medical History:    ADD (attention deficit disorder)    Anxiety    Male pattern alopecia     History reviewed. No pertinent surgical history.  Current Outpatient Medications   Medication Sig Dispense Refill    buPROPion  MG Oral Tablet 24 Hr Take 1 tablet (300 mg total) by mouth daily.      multivitamin Oral Chew Tab Chew 1 tablet by mouth daily.      Omega-3 Fatty Acids (FISH OIL) 300 MG Oral Cap Take by mouth.      Misc Natural Products (SUPER GREENS OR) Take by mouth.      psyllium 28 % Oral Powd Pack Take 1 packet by mouth 2 (two) times daily.      Na Sulfate-K Sulfate-Mg Sulf (SUPREP BOWEL PREP KIT) 17.5-3.13-1.6 GM/177ML Oral Solution Take as directed by GI clinic. Okay to substitute for generic. 1 each 0    finasteride (PROPECIA) 1 MG Oral Tab Take 1 tablet (1 mg total) by mouth daily. 90 tablet 1    Atomoxetine HCl 60 MG Oral Cap       traZODone HCl 150 MG Oral Tab Take 1 tablet (150 mg total) by mouth as needed.       No Known Allergies  Family History    Problem Relation Age of Onset    High Blood Pressure Mother     Prostate Cancer Father     No Known Problems Brother     Colon Cancer Maternal Grandfather     Heart Disorder Maternal Grandfather        Social History     Occupational History    Not on file   Tobacco Use    Smoking status: Former     Current packs/day: 0.00     Types: Cigarettes     Quit date:      Years since quittin.6    Smokeless tobacco: Never   Vaping Use    Vaping status: Never Used   Substance and Sexual Activity    Alcohol use: Not Currently     Comment: none past 2.5 years, former heavy use    Drug use: Never    Sexual activity: Not on file        Review of Systems:  GENERAL: denies fevers, chills, night sweats, fatigue, unintentional weight loss/gain  SKIN: denies skin lesions, open sores, rash  HEENT:denies recent vision change, new nasal congestion,hearing loss, tinnitus, sore throat, headaches  RESPIRATORY: denies new shortness of breath, cough, asthma, wheezing  CARDIOVASCULAR: denies chest pain, leg cramps with exertion, palpitations, leg swelling  GI: denies abdominal pain, nausea, vomiting, diarrhea, constipation, hematochezia, worsening heartburn or stomach ulcers  : denies dysuria, hematuria, incontinence, increased frequency, urgency, difficulty urinating  MUSCULOSKELETAL: denies musculoskeletal complaints other than in HPI  NEURO: denies numbness, tingling, weakness, balance issues, dizziness, memory loss  PSYCHIATRIC: denies Hx of depression, anxiety, other psychiatric disorders  HEMATOLOGIC: denies blood clots, anemia, blood clotting disorders, blood transfusion  ENDOCRINE: denies autoimmune disease, thyroid issues, or diabetes  ALLERGY: denies asthma, seasonal allergies    Physical Examination:    There were no vitals taken for this visit.  Constitutional: appears well hydrated, alert and responsive, no acute distress noted  Extremities: Cervical spine range of motion full.  No exacerbation of symptoms with  motion.  Spurling sign negative.    Examination of the right shoulder reveals no obvious atrophy and no prominence.  Active forward flexion is 130 degrees with pain.  Active and passive external rotation is to 45 degrees with pain.  Passive internal rotation 30 degrees with pain.  Abduction external rotation and belly press strength are 5-5.  Crank sign positive.  Speed's Test mildly positive.  Truong impingement sign mildly positive  Neurological: Right hand light touch and pinprick sensory exam normal. Lateral shoulder light touch and pinprick sensory examination normal.  strength,wrist extension, biceps, triceps, and shoulder abduction strength 5 out of 5. Maycol sign negative. No obvious atrophy of the hand.        Imaging:   XR SHOULDER, COMPLETE (MIN 2 VIEWS), RIGHT (CPT=73030)    Result Date: 8/1/2024  PROCEDURE: XR SHOULDER, COMPLETE (MIN 2 VIEWS), RIGHT (CPT=73030)  COMPARISON: [facility], XR SHOULDER, COMPLETE (MIN 2 VIEWS), RIGHT (CPT=73030), 11/01/2021, 10:42 AM.  INDICATIONS: Chronic right lateral shoulder pain. No recent injury.  TECHNIQUE: 3 views were obtained.   FINDINGS:  BONES: Advanced right glenohumeral joint osteoarthritis.  Minimal AC joint osteoarthritis.  No suspect bone lesion or fracture. SOFT TISSUES: Negative. No visible soft tissue swelling. EFFUSION: None visible. OTHER: Atherosclerotic calcification aorta.         CONCLUSION:  1. Advanced right glenohumeral joint osteoarthritis.    Dictated by (CST): Marlon Ryan MD on 8/01/2024 at 2:45 PM     Finalized by (CST): Marlon Ryan MD on 8/01/2024 at 2:53 PM             Labs:  Lab Results   Component Value Date    WBC 5.3 01/23/2024    HGB 14.5 01/23/2024    .0 01/23/2024      Lab Results   Component Value Date    GLU 94 01/23/2024    BUN 15 01/23/2024    CREATSERUM 0.96 01/23/2024    GFRNAA 94 05/31/2022    GFRAA 109 05/31/2022        Assessment and Plan:  Diagnoses and all orders for this  visit:    Glenohumeral arthritis, right    Shoulder pain, unspecified chronicity, unspecified laterality  -     XR SHOULDER, COMPLETE (MIN 2 VIEWS), RIGHT (CPT=73030); Future        Assessment: Right shoulder glenohumeral osteoarthritis    Plan: I discussed operative and nonoperative treatment options.  He is failed conservative treatments to date.  He is had multiple injections into the shoulder with corticosteroid.  He tried physical therapy in the past without improvement.  I advised continued range of motion type exercises and avoidance of high-impact heavy loading type exercise for the shoulder.  Consider reconstructive shoulder options.  I explained to him that total shoulder arthroplasty would not be an ideal procedure for him given his young age and active lifestyle.  He may benefit from a hemiarthroplasty with a ream and run procedure for the glenoid.  May consider this option in the future.  Advised an MRI of the shoulder evaluate the glenoid morphology and rotator cuff integrity.  Follow-up again after the MRI.    Follow Up: Return for follow-up visit after ordered tests completed.    KAYLA HERRERA MD

## 2024-09-06 ENCOUNTER — LAB ENCOUNTER (OUTPATIENT)
Dept: LAB | Facility: HOSPITAL | Age: 39
End: 2024-09-06
Payer: COMMERCIAL

## 2024-09-06 ENCOUNTER — OFFICE VISIT (OUTPATIENT)
Facility: CLINIC | Age: 39
End: 2024-09-06

## 2024-09-06 VITALS
HEART RATE: 105 BPM | BODY MASS INDEX: 24.08 KG/M2 | SYSTOLIC BLOOD PRESSURE: 128 MMHG | WEIGHT: 172 LBS | HEIGHT: 71 IN | DIASTOLIC BLOOD PRESSURE: 83 MMHG

## 2024-09-06 DIAGNOSIS — R79.89 ELEVATED LFTS: ICD-10-CM

## 2024-09-06 DIAGNOSIS — M25.551 RIGHT-SIDED ISCHIAL PAIN: ICD-10-CM

## 2024-09-06 DIAGNOSIS — R79.89 ELEVATED LFTS: Primary | ICD-10-CM

## 2024-09-06 LAB
ALBUMIN SERPL-MCNC: 4.7 G/DL (ref 3.2–4.8)
ALP LIVER SERPL-CCNC: 94 U/L
ALT SERPL-CCNC: 51 U/L
AST SERPL-CCNC: 38 U/L (ref ?–34)
BILIRUB DIRECT SERPL-MCNC: 0.2 MG/DL (ref ?–0.3)
BILIRUB SERPL-MCNC: 0.7 MG/DL (ref 0.3–1.2)
PROT SERPL-MCNC: 7.2 G/DL (ref 5.7–8.2)

## 2024-09-06 PROCEDURE — 3008F BODY MASS INDEX DOCD: CPT

## 2024-09-06 PROCEDURE — 80076 HEPATIC FUNCTION PANEL: CPT

## 2024-09-06 PROCEDURE — 36415 COLL VENOUS BLD VENIPUNCTURE: CPT

## 2024-09-06 PROCEDURE — 3074F SYST BP LT 130 MM HG: CPT

## 2024-09-06 PROCEDURE — 3079F DIAST BP 80-89 MM HG: CPT

## 2024-09-06 PROCEDURE — 99214 OFFICE O/P EST MOD 30 MIN: CPT

## 2024-09-06 RX ORDER — ATOMOXETINE 100 MG/1
100 CAPSULE ORAL DAILY
COMMUNITY
Start: 2024-06-19

## 2024-09-06 NOTE — PATIENT INSTRUCTIONS
-go to lab for repeat liver labs   Epidermal Autograft Text: The defect edges were debeveled with a #15 scalpel blade.  Given the location of the defect, shape of the defect and the proximity to free margins an epidermal autograft was deemed most appropriate.  Using a sterile surgical marker, the primary defect shape was transferred to the donor site. The epidermal graft was then harvested.  The skin graft was then placed in the primary defect and oriented appropriately.

## 2024-09-06 NOTE — PROGRESS NOTES
[facility] [address] - Gastroenterology                                                                                                               Reason for consult: elevated LFTs, family hx colon cancer    Requesting physician or provider: Mariano Villa MD    No chief complaint on file.      HPI:   Jim Cat is a 38 year old year-old male with active diagnoses including ADD, anxiety.    Today:  #elevated LFTs  -no recheck since January. M2 ab, ASMA & hepatis panel unremarkable.  -no hepatotoxic agents identified from med list, advised to avoid ashwagandha supplement however    #right side pain  -ongoing right side pain above iliac crest. No etiology on prior ultrasound. Has CLN scheduled for October    Prior visit 3/8/24  #elevated LFTs  -AST & ALT mild elevation in January 2024. Previously normal in Oct 2022. Ultrasound abdomen done 2/18: normal appearing liver & gallbladder. Pancreas partially obscured by bowel  -pancreatitis in 2016 from heavy ETOH use after honeymoon to [facility]    #family hx colon CA  -maternal grandpa, colon cancer diagnosed around age 50  -patient has chronic right low back and right side pain and is concerned this could be coming from deep abdomen/colon    Personal hx of liver disease or hepatitis: none   Family hx of liver disease:  none   Etoh use: none past 2.5 years  Meds, herbals, vitamins: no hepatic toxic agent identified that requires dose adjustment at this time  -med list in EPIC- see assessment portion in note  -other: takes ashwagandha supplement occasionally. Advised patient to avoid  Illicit drug use:  none   Autoimmune conditions: none   Toxin exposure at work:         Patient denies symptoms of nausea, vomiting, dyspepsia, dysphagia, odynophagia, globus sensation, heartburn, hematemesis, abdominal pain, change in bowel habits, constipation, diarrhea, hematochezia, or melena.  he denies recent change in appetite, fever or unintentional weight loss.      Last colonoscopy: none   Last EGD: none     NSAIDS: none   Tobacco: former   Alcohol: former heavy use, none past 2.5 years    Marijuana: none   Illicit drugs:none     FH GI malignancy: maternal grandpa - colon cancer, diagnosed around age 50  FH IBD: none     No history of adverse reaction to sedation  No MODESTA  No anticoagulants  No pacemaker/defibrillator  YES pain medications and/or sleep aides - trazodone as needed     Wt Readings from Last 6 Encounters:   24 172 lb (78 kg)   24 180 lb (81.6 kg)   24 180 lb (81.6 kg)   24 180 lb (81.6 kg)   10/27/23 172 lb (78 kg)   23 172 lb (78 kg)        History, Medications, Allergies, ROS:      Past Medical History:    ADD (attention deficit disorder)    Anxiety    Male pattern alopecia      History reviewed. No pertinent surgical history.   Family Hx:   Family History   Problem Relation Age of Onset    High Blood Pressure Mother     Prostate Cancer Father     No Known Problems Brother     Colon Cancer Maternal Grandfather     Heart Disorder Maternal Grandfather       Social History:   Social History     Socioeconomic History    Marital status:    Tobacco Use    Smoking status: Former     Current packs/day: 0.00     Types: Cigarettes     Quit date:      Years since quittin.6    Smokeless tobacco: Never   Vaping Use    Vaping status: Never Used   Substance and Sexual Activity    Alcohol use: Not Currently     Comment: none past 2.5 years, former heavy use    Drug use: Never     Social Determinants of Health      Received from [facility], [facility]    Social Connections    Received from [facility], [facility]    Housing Stability        Medications (Active prior to today's visit):  Current Outpatient Medications   Medication Sig Dispense Refill    Atomoxetine HCl 100 MG  Oral Cap Take 1 capsule (100 mg total) by mouth daily.      buPROPion  MG Oral Tablet 24 Hr Take 1 tablet (300 mg total) by mouth daily.      multivitamin Oral Chew Tab Chew 1 tablet by mouth daily.      Omega-3 Fatty Acids (FISH OIL) 300 MG Oral Cap Take by mouth.      Misc Natural Products (SUPER GREENS OR) Take by mouth.      psyllium 28 % Oral Powd Pack Take 1 packet by mouth 2 (two) times daily.      Na Sulfate-K Sulfate-Mg Sulf (SUPREP BOWEL PREP KIT) 17.5-3.13-1.6 GM/177ML Oral Solution Take as directed by GI clinic. Okay to substitute for generic. 1 each 0    finasteride (PROPECIA) 1 MG Oral Tab Take 1 tablet (1 mg total) by mouth daily. 90 tablet 1    Atomoxetine HCl 60 MG Oral Cap       traZODone HCl 150 MG Oral Tab Take 1 tablet (150 mg total) by mouth as needed.         Allergies:  No Known Allergies    ROS:   CONSTITUTIONAL: negative for fevers, chills, sweats  EYES Negative for scleral icterus or redness, and diplopia  HEENT: Negative for hoarseness  RESPIRATORY: Negative for cough and severe shortness of breath  CARDIOVASCULAR: Negative for crushing sub-sternal chest pain  GASTROINTESTINAL: See HPI  GENITOURINARY: Negative for dysuria  MUSCULOSKELETAL: Negative for arthralgias and myalgias  SKIN: Negative for jaundice, rash or pruritus  NEUROLOGICAL: Negative for dizziness and headaches  BEHAVIOR/PSYCH: Negative for psychotic behavior    PHYSICAL EXAM:   Blood pressure 128/83, pulse 105, height 5' 11\" (1.803 m), weight 172 lb (78 kg).    GEN: Alert, no acute distress, well-nourished   HEENT: anicteric sclera, neck supple, trachea midline, MMM, no palpable or tender neck or supraclavicular lymph nodes  CV: RRR, the extremities are warm and well perfused   LUNGS: No increased work of breathing, CTAB  ABDOMEN: Soft, symmetrical, non-tender without distention or guarding. No scars or lesions. Aorta is without bruit or visible pulsation. Umbilicus is midline without herniation. Normoactive bowel  sounds are present, No masses, hepatomegaly or splenomegaly noted.  MSK: No erythema, no warmth, no swelling of joints  SKIN: No jaundice, no erythema, no rashes, no lesions  HEMATOLOGIC: No bleeding, no bruising  NEURO: Alert and interactive, NEAL  PSYCH: appropriate mood & affect    Labs/Imaging/Procedures:     Patient's pertinent labs and imaging were reviewed and discussed with patient today.        .  ASSESSMENT/PLAN:   Jim Cat is a 38 year old year-old male with active diagnoses including ADD, anxiety.    Today:  #elevated LFTs  -no recheck since January. M2 ab, ASMA & hepatis panel unremarkable.  -no hepatotoxic agents identified from med list, advised to avoid ashwagandha supplement however  -recheck LFTs today, plan pending result    #right side pain  -ongoing right side pain above iliac crest. No etiology on prior ultrasound. Has CLN scheduled for October  -advised to monitor for change. Can consider CT if no etiology on colonoscopy. No tenderness on exam    Recommendations:  -repeat LFTs    Orders This Visit:  Orders Placed This Encounter   Procedures    Hepatic Function Panel (7)       Meds This Visit:  Requested Prescriptions      No prescriptions requested or ordered in this encounter       Imaging & Referrals:  None      DEMIAN Avila    [facility] Gastroenterology  9/6/2024        This note was partially prepared using Dragon Medical voice recognition dictation software. As a result, errors may occur. When identified, these errors have been corrected. While every attempt is made to correct errors during dictation, discrepancies may still exist.

## 2024-10-12 ENCOUNTER — IMMUNIZATION (OUTPATIENT)
Dept: LAB | Age: 39
End: 2024-10-12
Attending: EMERGENCY MEDICINE
Payer: COMMERCIAL

## 2024-10-12 DIAGNOSIS — Z23 NEED FOR VACCINATION: Primary | ICD-10-CM

## 2024-10-12 PROCEDURE — 90471 IMMUNIZATION ADMIN: CPT

## 2024-10-12 PROCEDURE — 90656 IIV3 VACC NO PRSV 0.5 ML IM: CPT

## 2025-01-28 ENCOUNTER — MED REC SCAN ONLY (OUTPATIENT)
Dept: INTERNAL MEDICINE CLINIC | Facility: CLINIC | Age: 40
End: 2025-01-28

## 2025-02-06 ENCOUNTER — OFFICE VISIT (OUTPATIENT)
Dept: FAMILY MEDICINE CLINIC | Facility: CLINIC | Age: 40
End: 2025-02-06
Payer: COMMERCIAL

## 2025-02-06 VITALS
BODY MASS INDEX: 24.08 KG/M2 | HEIGHT: 71 IN | TEMPERATURE: 98 F | WEIGHT: 172 LBS | SYSTOLIC BLOOD PRESSURE: 118 MMHG | DIASTOLIC BLOOD PRESSURE: 76 MMHG | OXYGEN SATURATION: 99 % | HEART RATE: 71 BPM | RESPIRATION RATE: 16 BRPM

## 2025-02-06 DIAGNOSIS — L03.012 PARONYCHIA OF LEFT INDEX FINGER: Primary | ICD-10-CM

## 2025-02-06 PROCEDURE — 3008F BODY MASS INDEX DOCD: CPT | Performed by: NURSE PRACTITIONER

## 2025-02-06 PROCEDURE — 3078F DIAST BP <80 MM HG: CPT | Performed by: NURSE PRACTITIONER

## 2025-02-06 PROCEDURE — 3074F SYST BP LT 130 MM HG: CPT | Performed by: NURSE PRACTITIONER

## 2025-02-06 PROCEDURE — 99213 OFFICE O/P EST LOW 20 MIN: CPT | Performed by: NURSE PRACTITIONER

## 2025-02-06 RX ORDER — BUPROPION HYDROCHLORIDE 150 MG/1
150 TABLET ORAL DAILY
COMMUNITY
Start: 2024-11-03

## 2025-02-06 RX ORDER — TADALAFIL 5 MG/1
TABLET ORAL
COMMUNITY
Start: 2025-01-21

## 2025-02-06 RX ORDER — CEFADROXIL 500 MG/1
500 CAPSULE ORAL 2 TIMES DAILY
Qty: 14 CAPSULE | Refills: 0 | Status: SHIPPED | OUTPATIENT
Start: 2025-02-06 | End: 2025-02-13

## 2025-02-06 RX ORDER — FINASTERIDE 5 MG/1
TABLET, FILM COATED ORAL
COMMUNITY
Start: 2024-12-16

## 2025-02-06 NOTE — PATIENT INSTRUCTIONS
Finish all oral antibiotics to prevent resistance  Apply warm compress to infection site 3 times daily for 15 minutes as a time.  Be sure to use a clean wash rag each time  Do not try to drain or \"pop\" lesion  If skin becomes more red, swollen, painful, lesion increases in size, or you develop fever IMMEDIATELY consult your primary care doctor or go to local Urgent Care facility  Follow up with your primary doctor if no improvement in skin infection in 3 days

## 2025-02-06 NOTE — PROGRESS NOTES
CHIEF COMPLAINT:     Chief Complaint   Patient presents with    Nail, Ingrown     Sx 2-3 days - L index finger redness, swollen  Denies drainage  OTC neosporin       HPI:     Jim Cat is a 39 year old male who presents with concerns of skin infection of the index finger nailbed. Patient first noticed symptoms 3 days ago.  Reports erythema, swelling, some tenderness to palpation;  no drainage from area.  Symptoms have been worsening since onset.  Affected location includes: left index finger.   Treatments: neosporin and warm water soak today.  States has had this in the past.  No other associated symptoms.  Denies fever, streaking of wound, or other signs of systemic illness.       Current Outpatient Medications   Medication Sig Dispense Refill    tadalafil 5 MG Oral Tab       finasteride 5 MG Oral Tab       buPROPion  MG Oral Tablet 24 Hr Take 1 tablet (150 mg total) by mouth daily.      Atomoxetine HCl 100 MG Oral Cap Take 1 capsule (100 mg total) by mouth daily.      multivitamin Oral Chew Tab Chew 1 tablet by mouth daily.      Omega-3 Fatty Acids (FISH OIL) 300 MG Oral Cap Take by mouth.      Misc Natural Products (SUPER GREENS OR) Take by mouth.      psyllium 28 % Oral Powd Pack Take 1 packet by mouth 2 (two) times daily.      traZODone HCl 150 MG Oral Tab Take 1 tablet (150 mg total) by mouth as needed.        Past Medical History:    ADD (attention deficit disorder)    Anxiety    Male pattern alopecia      Social History:  Social History     Socioeconomic History    Marital status:    Tobacco Use    Smoking status: Former     Current packs/day: 0.00     Types: Cigarettes     Quit date:      Years since quittin.1    Smokeless tobacco: Never   Vaping Use    Vaping status: Never Used   Substance and Sexual Activity    Alcohol use: Not Currently     Comment: none past 2.5 years, former heavy use    Drug use: Never     Social Drivers of Health      Received from [facility]  [facility], [facility]    Housing Stability        REVIEW OF SYSTEMS:   GENERAL: feels well otherwise, no fever, no chills.  SKIN: as above.  CHEST: no chest pains, no palpitations.  LUNGS: denies shortness of breath with exertion or rest. No wheezing, no cough.  LYMPH: no enlargement of the lymph nodes.  MUSC/SKEL: no joint swelling, no joint stiffness.  NEURO: no abnormal sensation, no tingling of the skin or numbness.    EXAM:   /76   Pulse 71   Temp 97.8 °F (36.6 °C) (Tympanic)   Resp 16   Ht 5' 11\" (1.803 m)   Wt 172 lb (78 kg)   SpO2 99%   BMI 23.99 kg/m²   GENERAL: well developed, well nourished,in no apparent distress  SKIN: + paronychia at lateral nail fold of left 2nd digit; area is erythematous, raised, tender to palpation.  Nail bed is intact.  no drainage at this time.  FAROM of digit, no pain with passive extension or ttp over flexor tendon sheath.  Cap refill <2 sec.  Normal sensation to area.      HEAD: atraumatic, normocephalic  EYES: conjunctiva clear, EOM intact  LUNGS: clear to auscultation bilaterally, no wheezes or rhonchi. Breathing is non labored.  CARDIO: RRR without murmur  EXTREMITIES: no cyanosis, clubbing or edema.    LYMPH: No epitrochlear or axillary lymphadenopathy.    NEURO: Normal sensation to area.  CMS intact.        ASSESSMENT AND PLAN:     ASSESSMENT:  Encounter Diagnosis   Name Primary?    Paronychia of left index finger Yes       PLAN:    Small I&D with sterile needle after icing site and cleansing with alcohol done.  Tolerated well.  No complications.  Instructions and Comfort Care as listed in Patient Instructions.   Skin care discussed with patient. Advised on nail hygiene, proper nail cutting, and warm soaks.    Medications as below.    Requested Prescriptions     Signed Prescriptions Disp Refills    cefadroxil 500 MG Oral Cap 14 capsule 0     Sig: Take 1 capsule (500 mg total) by mouth 2 (two) times daily for 7 days.       Risks,  benefits, side effects of medication explained and discussed.     To follow up with pcp if symptoms do not resolve as anticipated.  The patient indicates understanding of these issues and agrees to the plan.     Patient Instructions   Finish all oral antibiotics to prevent resistance  Apply warm compress to infection site 3 times daily for 15 minutes as a time.  Be sure to use a clean wash rag each time  Do not try to drain or \"pop\" lesion  If skin becomes more red, swollen, painful, lesion increases in size, or you develop fever IMMEDIATELY consult your primary care doctor or go to local Urgent Care facility  Follow up with your primary doctor if no improvement in skin infection in 3 days

## 2025-02-20 ENCOUNTER — OFFICE VISIT (OUTPATIENT)
Dept: PHYSICAL MEDICINE AND REHAB | Facility: CLINIC | Age: 40
End: 2025-02-20
Payer: COMMERCIAL

## 2025-02-20 ENCOUNTER — TELEPHONE (OUTPATIENT)
Dept: PHYSICAL MEDICINE AND REHAB | Facility: CLINIC | Age: 40
End: 2025-02-20

## 2025-02-20 VITALS — HEIGHT: 71 IN | WEIGHT: 172 LBS | BODY MASS INDEX: 24.08 KG/M2

## 2025-02-20 DIAGNOSIS — F41.9 ANXIETY: ICD-10-CM

## 2025-02-20 DIAGNOSIS — M75.41 SUBACROMIAL IMPINGEMENT OF RIGHT SHOULDER: ICD-10-CM

## 2025-02-20 DIAGNOSIS — M19.011 OSTEOARTHRITIS OF RIGHT GLENOHUMERAL JOINT: ICD-10-CM

## 2025-02-20 DIAGNOSIS — G25.89 SCAPULAR DYSKINESIS: ICD-10-CM

## 2025-02-20 DIAGNOSIS — M67.919 TENDINOPATHY OF ROTATOR CUFF, UNSPECIFIED LATERALITY: ICD-10-CM

## 2025-02-20 DIAGNOSIS — M25.511 ACUTE PAIN OF RIGHT SHOULDER: ICD-10-CM

## 2025-02-20 DIAGNOSIS — M75.21 BICEPS TENDONITIS ON RIGHT: Primary | ICD-10-CM

## 2025-02-20 DIAGNOSIS — M75.51 SUBACROMIAL BURSITIS OF RIGHT SHOULDER JOINT: ICD-10-CM

## 2025-02-20 PROCEDURE — 99214 OFFICE O/P EST MOD 30 MIN: CPT | Performed by: PHYSICAL MEDICINE & REHABILITATION

## 2025-02-20 PROCEDURE — 3008F BODY MASS INDEX DOCD: CPT | Performed by: PHYSICAL MEDICINE & REHABILITATION

## 2025-02-20 PROCEDURE — 20611 DRAIN/INJ JOINT/BURSA W/US: CPT | Performed by: PHYSICAL MEDICINE & REHABILITATION

## 2025-02-20 RX ORDER — TRIAMCINOLONE ACETONIDE 40 MG/ML
40 INJECTION, SUSPENSION INTRA-ARTICULAR; INTRAMUSCULAR ONCE
Status: COMPLETED | OUTPATIENT
Start: 2025-02-20 | End: 2025-02-20

## 2025-02-20 RX ORDER — LIDOCAINE HYDROCHLORIDE 10 MG/ML
9 INJECTION, SOLUTION INFILTRATION; PERINEURAL ONCE
Status: COMPLETED | OUTPATIENT
Start: 2025-02-20 | End: 2025-02-20
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
## 2025-02-20 NOTE — PATIENT INSTRUCTIONS
1) My office will call you to schedule the RIGHT GH CSI under US once the procedure is approved by your insurance carrier.    2) My office will call you to schedule the RIGHT biceps tendon Sheath Csi under US once the procedure is approved by your insurance carrier.    3) Make an appointment with Dr. Long Najera to discuss second opinion on shoulder surgery    Post Injection Instructions     Please do not do anything strenuous over the next two days (if you had a knee injection do not walk more than 2 city blocks, do not attend any aerobic classes, do not run, no heavy lifting, no prolong standing).  You may resume your day to day activities after your injection.  You may experience some mild amount of swelling after the procedure.  Please ice your joint that was injected at least 5-6 times a day (15 minutes) for two days after (this will help prevent worsening pain that sometimes occurs after an injection).  Only take tylenol if needed for pain for the first few days.  Watch for signs of infection which include redness, warmth, worsening pain, fevers or chills.  If you develop any of these signs call the office immediately at [phone]    Everyone responds differently to injections, but you can expect your peak effects a few weeks after your last injection.  Alex B. Behar MD  Physical Medicine and Rehabilitation/Sports Medicine  [facility]

## 2025-02-20 NOTE — PROCEDURES
[facility]   Shoulder Injection Procedure Note    CHIEF COMPLAINT:    Chief Complaint   Patient presents with    Follow - Up     LOVs 7/1/24, 7/8/24 for R GH CSI, R biceps tendon sheath CSI under US. Injections provided 50% improvement for 4 months. Pain 6/10, reaching 9/10 at times, through entire shoulder radiating into bicep occasionally. Pt takes ibuprofen prn with some improvement. Pt is going to recovery Rx for PT with improvement in mobility. XR R shouder 8/1/24.       PROCEDURE PERFORMED:  RIGHT glenohumeral joint  corticosteroid injection under ultrasound guidance    INDICATIONS:  RIGHT shoulder pain    PRIMARY PROCEDURALIST:  Alex Behar, MD    INFORMED CONSENT & TIME OUT:   As documented in the Time Out and Pre-Procedure Check Lists.  Verbal consent was obtained    Vitals: [unfilled]  Labs (document last wbc, plts, hgb, and PT/INR):   [facility] Lab Encounter on 09/06/2024   Component Date Value Ref Range Status    AST 09/06/2024 38 (H)  <34 U/L Final    ALT 09/06/2024 51 (H)  10 - 49 U/L Final    Alkaline Phosphatase 09/06/2024 94  45 - 117 U/L Final    Bilirubin, Total 09/06/2024 0.7  0.3 - 1.2 mg/dL Final    Bilirubin, Direct 09/06/2024 0.2  <=0.3 mg/dL Final    Total Protein 09/06/2024 7.2  5.7 - 8.2 g/dL Final    Albumin 09/06/2024 4.7  3.2 - 4.8 g/dL Final   ]    PROCEDURE:    The risks and benefits of intraarticular corticosteroid injection were again explained to the patient and verbal consent was obtained. The RIGHT Left shoulder was prepped and draped in the usual sterile fashion. Using a linear ultrasound transducer, the RIGHT glenohumeral joint was identified. A 22 G 3.5-inch needle was introduced into the shoulder while injecting 5 cc of 1% lidocain under ultrasound guidance. The needle was seen in the glenohumeral space. Aspiration was attempted and there was no fluid able to be aspirated. We switched the syringe to one containing 4 mL of 1% lidocaine  and 1 mL of 40 mg/mL Kenalog and it was injected.  The needle was then removed and hemostasis was obtained.  The skin site was cleansed and a clean dressing was applied.  The patient tolerated the procedure well.     Patient verbalized understanding of assessment and plan.  Patient is in agreement with the plan.  All questions were answered.  No barriers to learning identified. Permanent pictures were saved.       INSTRUCTIONS GIVEN TO PATIENT:    \"You will see an effect in the next 2-3 days.  Please contact me if you have fevers, worsening swelling, worsening pain, decreased range of motion, increased redness, chills, or anything that makes you concerned about how the joint we injected feels/looks.  If you do not reach me in a reasonable time, please report directly to the emergency room for further evaluation\"      Alex B. Behar MD, [facility] & CAM  Physical Medicine and Rehabilitation/Sports Medicine  [facility]

## 2025-02-20 NOTE — TELEPHONE ENCOUNTER
Initiated authorization for RGHT biceps tendon sheath CSI under US. CPT/HCPCS M75.21, dx:[id number], [id number],  with [employer] portal.    AND      Initiated authorization for RIGHT Gh CSI under US. CPT/HCPCS 19985,  dx:M75.51 with [employer] portal.  Completed in the office today.    Status: No action required  Reference/Authorization # [id number]  Valid: 2/20/25-5/20/25  Authorization is not required based on medical necessity, however, is not a guarantee of payment and may be subject to review once claim is submitted.

## 2025-03-03 ENCOUNTER — OFFICE VISIT (OUTPATIENT)
Dept: PHYSICAL MEDICINE AND REHAB | Facility: CLINIC | Age: 40
End: 2025-03-03
Payer: COMMERCIAL

## 2025-03-03 DIAGNOSIS — M75.21 BICEPS TENDONITIS ON RIGHT: Primary | ICD-10-CM

## 2025-03-03 RX ORDER — TRIAMCINOLONE ACETONIDE 40 MG/ML
40 INJECTION, SUSPENSION INTRA-ARTICULAR; INTRAMUSCULAR ONCE
Status: COMPLETED | OUTPATIENT
Start: 2025-03-03 | End: 2025-03-03

## 2025-03-03 RX ORDER — LIDOCAINE HYDROCHLORIDE 10 MG/ML
5 INJECTION, SOLUTION INFILTRATION; PERINEURAL ONCE
Status: COMPLETED | OUTPATIENT
Start: 2025-03-03 | End: 2025-03-03

## 2025-03-03 NOTE — PATIENT INSTRUCTIONS
Post Injection Instructions     Please do not do anything strenuous over the next two days (if you had a knee injection do not walk more than 2 city blocks, do not attend any aerobic classes, do not run, no heavy lifting, no prolong standing).  You may resume your day to day activities after your injection.  You may experience some mild amount of swelling after the procedure.  Please ice your joint that was injected at least 5-6 times a day (15 minutes) for two days after (this will help prevent worsening pain that sometimes occurs after an injection).  Only take tylenol if needed for pain for the first few days.  Watch for signs of infection which include redness, warmth, worsening pain, fevers or chills.  If you develop any of these signs call the office immediately at [phone]    Everyone responds differently to injections, but you can expect your peak effects a few weeks after your last injection.  Alex B. Behar MD  Physical Medicine and Rehabilitation/Sports Medicine  [facility]    oral

## 2025-03-03 NOTE — PROCEDURES
[facility]   Shoulder Injection Procedure Note     CHIEF COMPLAINT:         Chief Complaint   Patient presents with    Injection       Pt here for RIGHT bicep tendon sheath CSI. CPL: 8/10.        PROCEDURE PERFORMED:  Right  biceps tendon sheath   corticosteroid injection under ultrasound guidance     INDICATIONS:    shoulder pain     PRIMARY PROCEDURALIST:  Alex Behar, MD     INFORMED CONSENT & TIME OUT:   As documented in the Time Out and Pre-Procedure Check Lists.  Verbal consent was obtained     PROCEDURE:     The risks and benefits of intraarticular corticosteroid injection were again explained to the patient and verbal consent was obtained. The   RIGHT shoulder was prepped and draped in the usual sterile fashion. Using a linear ultrasound transducer, the   RIGHT biceps tendon sheath  was identified. A 25 G 1.5-inch needle was introduced into the shoulder while injecting 2 cc of 1% lidocain under ultrasound guidance. The needle was seen in the biceps tendon sheath. Aspiration was attempted and there was 1cc of serous fluid able to be aspirated. We switched the syringe to one containing 2 mL of 1% lidocaine and 1 mL of 40 mg/mL Kenalog and it was injected.  The needle was then removed and hemostasis was obtained.  The skin site was cleansed and a clean dressing was applied.  The patient tolerated the procedure well.      Patient verbalized understanding of assessment and plan.  Patient is in agreement with the plan.  All questions were answered.  No barriers to learning identified. Permanent pictures were saved.         INSTRUCTIONS GIVEN TO PATIENT:    \"You will see an effect in the next 2-3 days.  Please contact me if you have fevers, worsening swelling, worsening pain, decreased range of motion, increased redness, chills, or anything that makes you concerned about how the joint we injected feels/looks.  If you do not reach me in a reasonable time, please report  directly to the emergency room for further evaluation\"       Alex B. Behar MD, [facility] & CAQSM  Physical Medicine and Rehabilitation/Sports Medicine  [facility]

## 2025-05-15 ENCOUNTER — OFFICE VISIT (OUTPATIENT)
Age: 40
End: 2025-05-15
Payer: COMMERCIAL

## 2025-05-15 ENCOUNTER — HOSPITAL ENCOUNTER (OUTPATIENT)
Dept: GENERAL RADIOLOGY | Facility: HOSPITAL | Age: 40
Discharge: HOME | End: 2025-05-15
Attending: STUDENT IN AN ORGANIZED HEALTH CARE EDUCATION/TRAINING PROGRAM
Payer: COMMERCIAL

## 2025-05-15 DIAGNOSIS — R52 PAIN: Primary | ICD-10-CM

## 2025-05-15 DIAGNOSIS — R52 PAIN: ICD-10-CM

## 2025-05-15 PROCEDURE — 73030 X-RAY EXAM OF SHOULDER: CPT | Performed by: STUDENT IN AN ORGANIZED HEALTH CARE EDUCATION/TRAINING PROGRAM

## 2025-05-15 PROCEDURE — 99205 OFFICE O/P NEW HI 60 MIN: CPT | Performed by: STUDENT IN AN ORGANIZED HEALTH CARE EDUCATION/TRAINING PROGRAM

## 2025-05-15 RX ORDER — TADALAFIL 10 MG/1
TABLET ORAL
COMMUNITY
Start: 2025-04-18

## 2025-05-15 RX ORDER — BUPROPION HYDROCHLORIDE 300 MG/1
300 TABLET ORAL DAILY
COMMUNITY
Start: 2025-03-18

## 2025-05-16 ENCOUNTER — TELEPHONE (OUTPATIENT)
Dept: ORTHOPEDICS CLINIC | Facility: CLINIC | Age: 40
End: 2025-05-16

## 2025-05-16 DIAGNOSIS — R52 PAIN: Primary | ICD-10-CM

## 2025-05-16 NOTE — PROGRESS NOTES
[place] - ORTHOPEDICS  [address]  [phone]     NURSING INTAKE COMMENTS:   Chief Complaint   Patient presents with    Shoulder Pain     New pt- R shoulder - onset- had old injury 20 yrs ago but no recent injury- rates pain 3-10/10 most of the time            The following individual(s) verbally consented to be recorded using ambient AI listening technology and understand that they can each withdraw their consent to this listening technology at any point by asking the clinician to turn off or pause the recording:    Patient name: Jim Cat          HPI:   History of Present Illness  Jim Cat is a 39 year old male with a history of shoulder dislocation who presents with chronic shoulder pain.    He has a history of shoulder dislocation from his college years. Initially, the shoulder did not cause significant issues, but over the past five to ten years, he has experienced increasing pain. The pain is severe, especially during physical activities such as Cross[employer] workouts, yoga, and golf. It affects his ability to perform these activities and causes distraction during daily life due to noise and cracking sounds from the shoulder.    He has been receiving corticosteroid injections approximately every six months to manage the pain. Despite these treatments, he continues to experience significant discomfort. He mentions having a high pain tolerance but feels the need to address the issue due to its impact on his quality of life.    He recently underwent an x-ray, which he has not seen for some time. 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 The shoulder pain affects his golf swing, which is a significant concern for him as he has a golf event in early June.    No other medical issues such as heart, lung, or kidney problems were reported.        Past Medical History[1]  Past Surgical History[2]  Current  Medications[3]  Allergies[4]  Family History[5]    Social History     Occupational History    Not on file   Tobacco Use    Smoking status: Former     Current packs/day: 0.00     Types: Cigarettes     Quit date:      Years since quittin.3    Smokeless tobacco: Never   Vaping Use    Vaping status: Never Used   Substance and Sexual Activity    Alcohol use: Not Currently     Comment: none past 2.5 years, former heavy use    Drug use: Never    Sexual activity: Not on file        Review of Systems:  GENERAL: denies fevers, chills, night sweats, fatigue, unintentional weight loss/gain  SKIN: denies skin lesions, open sores, rash  HEENT:denies recent vision change, new nasal congestion,hearing loss, tinnitus, sore throat, headaches  RESPIRATORY: denies new shortness of breath, cough, asthma, wheezing  CARDIOVASCULAR: denies chest pain, leg cramps with exertion, palpitations, leg swelling  GI: denies abdominal pain, nausea, vomiting, diarrhea, constipation, hematochezia, worsening heartburn or stomach ulcers  : denies dysuria, hematuria, incontinence, increased frequency, urgency, difficulty urinating  MUSCULOSKELETAL: denies musculoskeletal complaints other than in HPI  NEURO: denies numbness, tingling, weakness, balance issues, dizziness, memory loss  PSYCHIATRIC: denies Hx of depression, anxiety, other psychiatric disorders  HEMATOLOGIC: denies blood clots, anemia, blood clotting disorders, blood transfusion  ENDOCRINE: denies autoimmune disease, thyroid issues, or diabetes  ALLERGY: denies asthma, seasonal allergies    Physical Examination:    There were no vitals taken for this visit.    Physical Exam  MUSCULOSKELETAL: Good range of motion in shoulder. Sharp pain on internal rotation of shoulder.    Right Shoulder Forward elevation 140, pain throughout arc of motion along with crepitus. No significant strength deficits with respect to rotator cuff. ER 60, IR lumbosacral          Imaging:      Results  RADIOLOGY  Shoulder X-ray: End-stage arthritis, goatee sign, zero joint space, bone-on-bone contact (05/15/2025)      Imaging was independently reviewed and interpreted by attending physician  No results found.     Labs:  Lab Results   Component Value Date    WBC 5.3 01/23/2024    HGB 14.5 01/23/2024    .0 01/23/2024      Lab Results   Component Value Date    GLU 94 01/23/2024    BUN 15 01/23/2024    CREATSERUM 0.96 01/23/2024    GFRNAA 94 05/31/2022    GFRAA 109 05/31/2022        Assessment and Plan:  Assessment & Plan  End stage arthritis of shoulder  Chronic end stage arthritis with significant pain and functional limitation. X-ray confirms severe osteoarthritis with bone-on-bone contact. Corticosteroid injections provide limited relief. Discussed pyrocarbon shoulder hemiarthroplasty as a suitable alternative to total shoulder replacement, offering high-level function and longevity.  - Schedule pyrocarbon shoulder hemiarthroplasty for September.  - Instruct to wear a sling for four weeks post-surgery.  - Plan follow-up visits at four weeks, eight to ten weeks, and twelve weeks post-surgery to assess range of motion.  - Advise against heavy exercise for three months post-surgery to allow tendon healing.  -      We reviewed the treatment of this disease condition, both surgical and nonsurgical. I have recommended that we proceed with surgical treatment as we agree surgical intervention would likely offer the best opportunity for symptomatic relief and functional recovery. I used diagrams, radiographic studies, and a 3-dimensional model to outline the patient's pathology, as well as general surgical principles.  In light of this, we recommend proceeding with a RIGHT Shoulder Hemiarthroplasty.     We discussed the risk and benefits of the procedure, including, but not limited to:  infection, blood loss, potential transient or permanent injury to nerves or blood vessels, joint stiffness, persistent  pain, need for future operation, failure of healing, wound complications, failure of therapeutic intervention, risk of re-injury, fracture, fixation failure, deep vein thrombosis and pulmonary embolism. We discussed the proposed rehabilitation timeline as well as expected postoperative restrictions.    The patient voiced a good understanding of treatment options, risks and benefits, postoperative instructions, rehabilitation timeline, and restrictions. The patient was given the opportunity to ask questions, which were all answered to the best of my ability and to the patient's satisfaction.       Rationale for 57 Modifier Addendum    Decision for Major Surgery  The decision for a major surgery was made during this visit/consultation based on review and discussion of the history of presentation, examination, available labs/imaging, and the patient's functional status. The medical and surgical history were considered with regards to risk and potential modifications needed.    Diagnoses and all orders for this visit:    Pain  -     XR SHOULDER, COMPLETE (MIN 2 VIEWS), RIGHT (CPT=73030); Future            Follow Up: No follow-ups on file.          Nader Pena MD Orthopedic Surgery / Sports Medicine Specialist  [facility]  [facility] [place] [address]  [email or web address]    t: [phone] f: [phone]    This note was dictated using Dragon software.  While it was briefly proofread prior to completion, some grammatical, spelling, and word choice errors due to dictation may still occur.       [1]   Past Medical History:   ADD (attention deficit disorder)    Anxiety    Male pattern alopecia   [2] No past surgical history on file.  [3]   Current Outpatient Medications   Medication Sig Dispense Refill    buPROPion  MG Oral Tablet 24 Hr Take 1 tablet (300 mg total) by mouth daily.      Tadalafil 10 MG Oral Tab       tadalafil 5 MG Oral Tab  (Patient not taking: Reported on 5/15/2025)       finasteride 5 MG Oral Tab       buPROPion  MG Oral Tablet 24 Hr Take 1 tablet (150 mg total) by mouth daily. (Patient not taking: Reported on 5/15/2025)      Atomoxetine HCl 100 MG Oral Cap Take 1 capsule (100 mg total) by mouth daily.      multivitamin Oral Chew Tab Chew 1 tablet by mouth daily.      Omega-3 Fatty Acids (FISH OIL) 300 MG Oral Cap Take by mouth.      Misc Natural Products (SUPER GREENS OR) Take by mouth.      psyllium 28 % Oral Powd Pack Take 1 packet by mouth 2 (two) times daily.      traZODone HCl 150 MG Oral Tab Take 1 tablet (150 mg total) by mouth as needed.     [4] No Known Allergies  [5]   Family History  Problem Relation Age of Onset    High Blood Pressure Mother     Prostate Cancer Father     No Known Problems Brother     Colon Cancer Maternal Grandfather     Heart Disorder Maternal Grandfather

## 2025-05-16 NOTE — PROGRESS NOTES
Ortho Surgery Request  Surgery: RIGHT Shoulder Hemiarthroplasty      Surgical Assistant: No  Surgery Day Request: Per Patient request  Estimated Procedure Length: 2.75  Anesthesia type: General + Block Interscalene   Position: Beach Chair   Special Needs:[]Mini C-Arm []Large C-arm  []Nurse Assist [x]SCD's []Surgical Assistant []Ultrasound []Ultrasound Maria Eugenia  Equipment: Tech urSelf  Location:Main OR  Post Op Visit Date: One Week in [place]  CPT Code: 34170       ICD Code: Pain [R52]   Medical Clearance Needed: Not Needed  Preadmission Testing Orders:  Anesthesia testing protocols and anesthesia pre op orders will be used  Additional PAT orders:  Pre OP Orders:  Use [facility] procedure driven order set in addition to anesthesia protocol  Additional Pre Op Orders:  PCN Allergy:  No  If Yes:   __X__  Admit:  Hospital outpatient surgery  Home Health  No

## 2025-08-06 ENCOUNTER — OFFICE VISIT (OUTPATIENT)
Facility: CLINIC | Age: 40
End: 2025-08-06

## 2025-08-06 DIAGNOSIS — M19.011 GLENOHUMERAL ARTHRITIS, RIGHT: Primary | ICD-10-CM

## 2025-08-06 PROCEDURE — 99213 OFFICE O/P EST LOW 20 MIN: CPT | Performed by: STUDENT IN AN ORGANIZED HEALTH CARE EDUCATION/TRAINING PROGRAM

## 2025-08-22 ENCOUNTER — LAB ENCOUNTER (OUTPATIENT)
Dept: LAB | Facility: HOSPITAL | Age: 40
End: 2025-08-22
Attending: STUDENT IN AN ORGANIZED HEALTH CARE EDUCATION/TRAINING PROGRAM

## 2025-08-22 DIAGNOSIS — Z01.818 PRE-OP TESTING: ICD-10-CM

## 2025-08-22 PROCEDURE — 87081 CULTURE SCREEN ONLY: CPT | Performed by: STUDENT IN AN ORGANIZED HEALTH CARE EDUCATION/TRAINING PROGRAM

## 2025-08-22 PROCEDURE — 87147 CULTURE TYPE IMMUNOLOGIC: CPT | Performed by: STUDENT IN AN ORGANIZED HEALTH CARE EDUCATION/TRAINING PROGRAM

## 2025-08-25 ENCOUNTER — PATIENT MESSAGE (OUTPATIENT)
Facility: CLINIC | Age: 40
End: 2025-08-25

## 2025-08-25 DIAGNOSIS — Z96.611 STATUS POST RIGHT SHOULDER HEMIARTHROPLASTY: Primary | ICD-10-CM

## 2025-08-26 RX ORDER — MUPIROCIN 2 %
OINTMENT (GRAM) TOPICAL
Qty: 15 G | Refills: 0 | Status: SHIPPED | OUTPATIENT
Start: 2025-09-04

## 2025-08-28 ENCOUNTER — OFFICE VISIT (OUTPATIENT)
Dept: INTERNAL MEDICINE CLINIC | Facility: CLINIC | Age: 40
End: 2025-08-28

## 2025-08-28 VITALS
HEIGHT: 71 IN | DIASTOLIC BLOOD PRESSURE: 64 MMHG | HEART RATE: 83 BPM | OXYGEN SATURATION: 98 % | SYSTOLIC BLOOD PRESSURE: 122 MMHG | BODY MASS INDEX: 24.5 KG/M2 | TEMPERATURE: 98 F | WEIGHT: 175 LBS | RESPIRATION RATE: 18 BRPM

## 2025-08-28 DIAGNOSIS — Z00.00 ANNUAL PHYSICAL EXAM: Primary | ICD-10-CM

## 2025-08-28 DIAGNOSIS — M25.511 CHRONIC RIGHT SHOULDER PAIN: ICD-10-CM

## 2025-08-28 DIAGNOSIS — G89.29 CHRONIC RIGHT SHOULDER PAIN: ICD-10-CM

## 2025-08-28 DIAGNOSIS — F41.1 GENERALIZED ANXIETY DISORDER: ICD-10-CM

## 2025-08-28 DIAGNOSIS — Z11.3 SCREEN FOR STD (SEXUALLY TRANSMITTED DISEASE): ICD-10-CM

## 2025-08-28 DIAGNOSIS — L64.9 MALE PATTERN ALOPECIA: ICD-10-CM

## (undated) DIAGNOSIS — F41.9 ANXIETY: ICD-10-CM

## (undated) DIAGNOSIS — M75.51 SUBACROMIAL BURSITIS OF RIGHT SHOULDER JOINT: ICD-10-CM

## (undated) DIAGNOSIS — F98.8 ATTENTION DEFICIT DISORDER, UNSPECIFIED HYPERACTIVITY PRESENCE: ICD-10-CM

## (undated) DIAGNOSIS — M75.41 SUBACROMIAL IMPINGEMENT OF RIGHT SHOULDER: ICD-10-CM

## (undated) DIAGNOSIS — M67.919 TENDINOPATHY OF ROTATOR CUFF, UNSPECIFIED LATERALITY: ICD-10-CM

## (undated) DIAGNOSIS — M75.21 BICEPS TENDONITIS ON RIGHT: ICD-10-CM

## (undated) DIAGNOSIS — M25.511 ACUTE PAIN OF RIGHT SHOULDER: ICD-10-CM

## (undated) NOTE — LETTER
AUTHORIZATION FOR SURGICAL OPERATION OR OTHER PROCEDURE    1. I hereby authorize Dr. Alex Behar and the [facility] staff assigned to my case to perform the following operation and/or procedure at the [facility]:    RIGHT biceps tendon sheath CSI under ultrasound guidance     2.  My physician has explained the nature and purpose of the operation or other procedure, possible alternative methods of treatment, the risks involved, and the possibility of complication to me.  I acknowledge that no guarantee has been made as to the result that may be obtained.  3.  I recognize that, during the course of this operation, or other procedure, unforseen conditions may necessitate additional or different procedure than those listed above.  I, therefore, further authorize and request that the above named physician, his/her physician assistants or designees perform such procedures as are, in his/her professional opinion, necessary and desirable.  4.  Any tissue or organs removed in the operation or other procedure may be disposed of by and at the discretion of the [facility] staff and [facility].  5.  I understand that in the event of a medical emergency, I will be transported by local paramedics to [facility] or other hospital emergency department.  6.  I certify that I have read and fully understand the above consent to operation and/or other procedure.    7.  I acknowledge that my physician has explained sedation/analgesia administration to me including the risks and benefits.  I consent to the administration of sedation/analgesia as may be necessary or desirable in the judgement of my physician.    Witness signature: ___________________________________________________ Date:  ______/______/_____                    Time:  ________ A.M.  P.M.       Patient Name:  Jim Cat  12/13/1985  [id number]       Patient signature:   ___________________________________________________                 Statement of Physician  My signature below affirms that prior to the time of the procedure, I have explained to the patient and/or his/her guardian, the risks and benefits involved in the proposed treatment and any reasonable alternative to the proposed treatment.  I have also explained the risks and benefits involved in the refusal of the proposed treatment and have answered the patient's questions.                        Date:  ______/______/_______  Provider                      Signature:  __________________________________________________________       Time:  ___________ ANICOLASA CASTAÑEDA

## (undated) NOTE — LETTER
AUTHORIZATION FOR SURGICAL OPERATION OR OTHER PROCEDURE    1. I hereby authorize Dr. Shanta Vargas and the [facility] staff assigned to my case to perform the following operation and/or procedure at the [facility]:    Right subacromial CSI under ultrasound guidance     2. My physician has explained the nature and purpose of the operation or other procedure, possible alternative methods of treatment, the risks involved, and the possibility of complication to me. I acknowledge that no guarantee has been made as to the result that may be obtained. 3.  I recognize that, during the course of this operation, or other procedure, unforseen conditions may necessitate additional or different procedure than those listed above. I, therefore, further authorize and request that the above named physician, his/her physician assistants or designees perform such procedures as are, in his/her professional opinion, necessary and desirable. 4.  Any tissue or organs removed in the operation or other procedure may be disposed of by and at the discretion of the [facility] staff and [facility]. 5.  I understand that in the event of a medical emergency, I will be transported by local paramedics to [facility] or other [facility] emergency department. 6.  I certify that I have read and fully understand the above consent to operation and/or other procedure. 7.  I acknowledge that my physician has explained sedation/analgesia administration to me including the risks and benefits. I consent to the administration of sedation/analgesia as may be necessary or desirable in the judgement of my physician. Witness signature: ___________________________________________________ Date:  ______/______/_____                    Time:  ________ A. M.  P.M.        Patient Name:  Zayra Jarrell  12/13/1985  [id number]       Patient signature:  ___________________________________________________                 Statement of Physician  My signature below affirms that prior to the time of the procedure, I have explained to the patient and/or his/her guardian, the risks and benefits involved in the proposed treatment and any reasonable alternative to the proposed treatment. I have also explained the risks and benefits involved in the refusal of the proposed treatment and have answered the patient's questions.                         Date:  ______/______/_______  Provider                      Signature:  __________________________________________________________       Time:  ___________ A.M    P.M.

## (undated) NOTE — LETTER
AUTHOIZATION FOR SURGICAL OPERATION OR OTHER PROCEDURE    1. I hereby authorize Dr. Nelda Yoo and the [facility] staff assigned to my case to perform the following operation and/or procedure at the [facility]:    Right [facility] under ultrasound guidance CPT 70244+     2. My physician has explained the nature and purpose of the operation or other procedure, possible alternative methods of treatment, the risks involved, and the possibility of complication to me. I acknowledge that no guarantee has been made as to the result that may be obtained. 3.  I recognize that, during the course of this operation, or other procedure, unforseen conditions may necessitate additional or different procedure than those listed above. I, therefore, further authorize and request that the above named physician, his/her physician assistants or designees perform such procedures as are, in his/her professional opinion, necessary and desirable. 4.  Any tissue or organs removed in the operation or other procedure may be disposed of by and at the discretion of the [facility] staff and [facility] AT [facility]. 5.  I understand that in the event of a medical emergency, I will be transported by local paramedics to [facility] or other hospital emergency department. 6.  I certify that I have read and fully understand the above consent to operation and/or other procedure. 7.  I acknowledge that my physician has explained sedation/analgesia administration to me including the risks and benefits. I consent to the administration of sedation/analgesia as may be necessary or desirable in the judgement of my physician. Witness signature: ___________________________________________________ Date:  ______/______/_____                    Time:  ________ A. M.  P.M.        Patient Name:  Candelaria Bautista  12/13/1985  [id number]       Patient signature:  ___________________________________________________                Statement of Physician  My signature below affirms that prior to the time of the procedure, I have explained to the patient and/or his/her guardian, the risks and benefits involved in the proposed treatment and any reasonable alternative to the proposed treatment. I have also explained the risks and benefits involved in the refusal of the proposed treatment and have answered the patient's questions.                         Date:  ______/______/_______  Provider                      Signature:  __________________________________________________________       Time:  ___________ A.M    P.M.

## (undated) NOTE — LETTER
AUTHORIZATION FOR SURGICAL OPERATION OR OTHER PROCEDURE    1. I hereby authorize Dr. Tyronne Spatz and the [facility] staff assigned to my case to perform the following operation and/or procedure at the [facility]:    right biceps tendon sheath CSI under ultrasound guidance    2. My physician has explained the nature and purpose of the operation or other procedure, possible alternative methods of treatment, the risks involved, and the possibility of complication to me. I acknowledge that no guarantee has been made as to the result that may be obtained. 3.  I recognize that, during the course of this operation, or other procedure, unforseen conditions may necessitate additional or different procedure than those listed above. I, therefore, further authorize and request that the above named physician, his/her physician assistants or designees perform such procedures as are, in his/her professional opinion, necessary and desirable. 4.  Any tissue or organs removed in the operation or other procedure may be disposed of by and at the discretion of the [facility] staff and [facility] AT [facility]. 5.  I understand that in the event of a medical emergency, I will be transported by local paramedics to [facility] or other [facility] emergency department. 6.  I certify that I have read and fully understand the above consent to operation and/or other procedure. 7.  I acknowledge that my physician has explained sedation/analgesia administration to me including the risks and benefits. I consent to the administration of sedation/analgesia as may be necessary or desirable in the judgement of my physician. Witness signature: ___________________________________________________ Date:  ______/______/_____                    Time:  ________ A. M.  P.M.        Patient Name:  Marlen Harmon  12/13/1985  [id number]       Patient signature: ___________________________________________________                 Statement of Physician  My signature below affirms that prior to the time of the procedure, I have explained to the patient and/or his/her guardian, the risks and benefits involved in the proposed treatment and any reasonable alternative to the proposed treatment. I have also explained the risks and benefits involved in the refusal of the proposed treatment and have answered the patient's questions.                         Date:  ______/______/_______  Provider                      Signature:  __________________________________________________________       Time:  ___________ ANICOLASA CASTAÑEDA

## (undated) NOTE — LETTER
AUTHORIZATION FOR SURGICAL OPERATION OR OTHER PROCEDURE    1. I hereby authorize Dr. Alex Behar and the [facility] staff assigned to my case to perform the following operation and/or procedure at the [facility] Office:    RIGHT biceps tendon sheath CSI under US     2.  My physician has explained the nature and purpose of the operation or other procedure, possible alternative methods of treatment, the risks involved, and the possibility of complication to me.  I acknowledge that no guarantee has been made as to the result that may be obtained.  3.  I recognize that, during the course of this operation, or other procedure, unforseen conditions may necessitate additional or different procedure than those listed above.  I, therefore, further authorize and request that the above named physician, his/her physician assistants or designees perform such procedures as are, in his/her professional opinion, necessary and desirable.  4.  Any tissue or organs removed in the operation or other procedure may be disposed of by and at the discretion of the [facility] staff and [facility].  5.  I understand that in the event of a medical emergency, I will be transported by local paramedics to [facility] or other hospital emergency department.  6.  I certify that I have read and fully understand the above consent to operation and/or other procedure.    7.  I acknowledge that my physician has explained sedation/analgesia administration to me including the risks and benefits.  I consent to the administration of sedation/analgesia as may be necessary or desirable in the judgement of my physician.    Witness signature: ___________________________________________________ Date:  ______/______/_____                    Time:  ________ A.M.  P.M.       Patient Name:   Jim Elizondo Stephania  12/13/1985  [id number]       Patient signature:  ___________________________________________________               Statement of  Physician  My signature below affirms that prior to the time of the procedure, I have explained to the patient and/or his/her guardian, the risks and benefits involved in the proposed treatment and any reasonable alternative to the proposed treatment.  I have also explained the risks and benefits involved in the refusal of the proposed treatment and have answered the patient's questions.                        Date:  ______/______/_______  Provider                      Signature:  __________________________________________________________       Time:  ___________ A.M    P.M.

## (undated) NOTE — LETTER
Date: 2024      Patient Name: Jim Cat      : 1985        Thank you for choosing [facility] as your health care provider. Your physician has deemed the following medical service(s) necessary. However, your insurance plan may not pay for all of your health care and costs and may deny payment for this service. The fact that your insurance plan does not pay for an item or service does not mean you should not receive it. The purpose of this form is to help you make an informed decision about whether or not you want to receive this service(s) that may not be paid for by your insurance plan.    CPT Code Description     Cost     _________ RIGHT biceps tendon sheath CSI under ultrasound guidance             _____________        I understand that the above mentioned service(s) or supply may not be covered by my insurance company. I agree to be financially responsible for the cost of this service or supply in the event of my insurance denies payment as a non-covered benefit.        ______________________________________________________________________  Signature of Patient or Patient's Representative  Relationship  Date    ______________________________________________________________________  Signature of Witness to signing of form   Printed Name

## (undated) NOTE — LETTER
AUTHORIZATION FOR SURGICAL OPERATION OR OTHER PROCEDURE    1. I hereby authorize Dr. Alex Behar and the [facility] staff assigned to my case to perform the following operation and/or procedure at the [facility] Office:    RIGHT GH Csi under ultrasound guidance     2.  My physician has explained the nature and purpose of the operation or other procedure, possible alternative methods of treatment, the risks involved, and the possibility of complication to me.  I acknowledge that no guarantee has been made as to the result that may be obtained.  3.  I recognize that, during the course of this operation, or other procedure, unforseen conditions may necessitate additional or different procedure than those listed above.  I, therefore, further authorize and request that the above named physician, his/her physician assistants or designees perform such procedures as are, in his/her professional opinion, necessary and desirable.  4.  Any tissue or organs removed in the operation or other procedure may be disposed of by and at the discretion of the [facility] staff and [facility].  5.  I understand that in the event of a medical emergency, I will be transported by local paramedics to [facility] or other hospital emergency department.  6.  I certify that I have read and fully understand the above consent to operation and/or other procedure.    7.  I acknowledge that my physician has explained sedation/analgesia administration to me including the risks and benefits.  I consent to the administration of sedation/analgesia as may be necessary or desirable in the judgement of my physician.    Witness signature: ___________________________________________________ Date:  ______/______/_____                    Time:  ________ A.M.  P.M.       Patient Name:  Jim Cat  12/13/1985  [id number]         Patient signature:  ___________________________________________________                Statement of  Physician  My signature below affirms that prior to the time of the procedure, I have explained to the patient and/or his/her guardian, the risks and benefits involved in the proposed treatment and any reasonable alternative to the proposed treatment.  I have also explained the risks and benefits involved in the refusal of the proposed treatment and have answered the patient's questions.                        Date:  ______/______/_______  Provider                      Signature:  __________________________________________________________       Time:  ___________ A.M    P.M.

## (undated) NOTE — LETTER
Date: 2022      Patient Name: Phuong Hancock      : 1985        Thank you for choosing [facility] as your health care provider. Your physician has deemed the following medical service(s) necessary. However, your insurance plan may not pay for all of your health care and costs and may deny payment for this service. The fact that your insurance plan does not pay for an item or service does not mean you should not receive it. The purpose of this form is to help you make an informed decision about whether or not you want to receive this service(s) that may not be paid for by your insurance plan. CPT Code Description     Cost     Right [address] [facility] under ultrasound guidance   CPT 00498+   $1200.00    I understand that the above mentioned service(s) or supply may not be covered by my insurance company.  I agree to be financially responsible for the cost of this service or supply in the event of my insurance denies payment as a non-covered benefit.        ______________________________________________________________________  Signature of Patient or Patient's Representative  Relationship  Date    ______________________________________________________________________  Signature of Witness to signing of form   Printed Name

## (undated) NOTE — LETTER
AUTHORIZATION FOR SURGICAL OPERATION OR OTHER PROCEDURE    1. I hereby authorize Dr. Armin York and the [facility] staff assigned to my case to perform the following operation and/or procedure at the [facility]:    RIGHT [address] [facility] under ultrasound gudiance     2. My physician has explained the nature and purpose of the operation or other procedure, possible alternative methods of treatment, the risks involved, and the possibility of complication to me. I acknowledge that no guarantee has been made as to the result that may be obtained. 3.  I recognize that, during the course of this operation, or other procedure, unforseen conditions may necessitate additional or different procedure than those listed above. I, therefore, further authorize and request that the above named physician, his/her physician assistants or designees perform such procedures as are, in his/her professional opinion, necessary and desirable. 4.  Any tissue or organs removed in the operation or other procedure may be disposed of by and at the discretion of the [facility] staff and [facility] AT [facility]. 5.  I understand that in the event of a medical emergency, I will be transported by local paramedics to [facility] or other hospital emergency department. 6.  I certify that I have read and fully understand the above consent to operation and/or other procedure. 7.  I acknowledge that my physician has explained sedation/analgesia administration to me including the risks and benefits. I consent to the administration of sedation/analgesia as may be necessary or desirable in the judgement of my physician. Witness signature: ___________________________________________________ Date:  ______/______/_____                    Time:  ________ A. M.  P.M.        [facility]  12/13/1985  [id number]         Patient signature:  ___________________________________________________      Statement of Physician  My signature below affirms that prior to the time of the procedure, I have explained to the patient and/or his/her guardian, the risks and benefits involved in the proposed treatment and any reasonable alternative to the proposed treatment. I have also explained the risks and benefits involved in the refusal of the proposed treatment and have answered the patient's questions.                         Date:  ______/______/_______  Provider                      Signature:  __________________________________________________________       Time:  ___________ AGarethM    P.M.

## (undated) NOTE — LETTER
AUTHORIZATION FOR SURGICAL OPERATION OR OTHER PROCEDURE    1. I hereby authorize Dr. Alex Behar and the [facility] staff assigned to my case to perform the following operation and/or procedure at the [facility]:    [facility]     2.  My physician has explained the nature and purpose of the operation or other procedure, possible alternative methods of treatment, the risks involved, and the possibility of complication to me.  I acknowledge that no guarantee has been made as to the result that may be obtained.  3.  I recognize that, during the course of this operation, or other procedure, unforseen conditions may necessitate additional or different procedure than those listed above.  I, therefore, further authorize and request that the above named physician, his/her physician assistants or designees perform such procedures as are, in his/her professional opinion, necessary and desirable.  4.  Any tissue or organs removed in the operation or other procedure may be disposed of by and at the discretion of the [facility] Office staff and [facility].  5.  I understand that in the event of a medical emergency, I will be transported by local paramedics to [facility] or other hospital emergency department.  6.  I certify that I have read and fully understand the above consent to operation and/or other procedure.    7.  I acknowledge that my physician has explained sedation/analgesia administration to me including the risks and benefits.  I consent to the administration of sedation/analgesia as may be necessary or desirable in the judgement of my physician.    Witness signature: ___________________________________________________ Date:  ______/______/_____                    Time:  ________ A.M.  P.M.       Patient Name:    Jim Cat  12/13/1985  [id number]       Patient signature:  ___________________________________________________               Statement of Physician  My  signature below affirms that prior to the time of the procedure, I have explained to the patient and/or his/her guardian, the risks and benefits involved in the proposed treatment and any reasonable alternative to the proposed treatment.  I have also explained the risks and benefits involved in the refusal of the proposed treatment and have answered the patient's questions.                        Date:  ______/______/_______  Provider                      Signature:  __________________________________________________________       Time:  ___________ A.M    P.M.

## (undated) NOTE — LETTER
Date: 2023      Patient Name: Zayra Jarrell      : 1985        Thank you for choosing [facility] as your health care provider. Your physician has deemed the following medical service(s) necessary. However, your insurance plan may not pay for all of your health care and costs and may deny payment for this service. The fact that your insurance plan does not pay for an item or service does not mean you should not receive it. The purpose of this form is to help you make an informed decision about whether or not you want to receive this service(s) that may not be paid for by your insurance plan. CPT Code Description     Cost     RIGHT [address] [facility] Csi under ultrasound gudiance       I understand that the above mentioned service(s) or supply may not be covered by my insurance company.  I agree to be financially responsible for the cost of this service or supply in the event of my insurance denies payment as a non-covered benefit.        ______________________________________________________________________  Signature of Patient or Patient's Representative  Relationship  Date    ______________________________________________________________________  Signature of Witness to signing of form   Printed Name

## (undated) NOTE — LETTER
Date: 2025      Patient Name: Jim Cat      : 1985        Thank you for choosing [facility] as your health care provider. Your physician has deemed the following medical service(s) necessary. However, your insurance plan may not pay for all of your health care and costs and may deny payment for this service. The fact that your insurance plan does not pay for an item or service does not mean you should not receive it. The purpose of this form is to help you make an informed decision about whether or not you want to receive this service(s) that may not be paid for by your insurance plan.    CPT Code Description     Cost     RIGHT GH CSI under US       I understand that the above mentioned service(s) or supply may not be covered by my insurance company. I agree to be financially responsible for the cost of this service or supply in the event of my insurance denies payment as a non-covered benefit.        ______________________________________________________________________  Signature of Patient or Patient's Representative  Relationship  Date    ______________________________________________________________________  Signature of Witness to signing of form   Printed Name

## (undated) NOTE — LETTER
AUTHORIZATION FOR SURGICAL OPERATION OR OTHER PROCEDURE    1. I hereby authorize Dr. Johnathan Quinonez and the [facility] Office staff assigned to my case to perform the following operation and/or procedure at the [facility] Office:    RIGHT subacromial CSI under ultrasound guidance CPT 94979+     2. My physician has explained the nature and purpose of the operation or other procedure, possible alternative methods of treatment, the risks involved, and the possibility of complication to me. I acknowledge that no guarantee has been made as to the result that may be obtained. 3.  I recognize that, during the course of this operation, or other procedure, unforseen conditions may necessitate additional or different procedure than those listed above. I, therefore, further authorize and request that the above named physician, his/her physician assistants or designees perform such procedures as are, in his/her professional opinion, necessary and desirable. 4.  Any tissue or organs removed in the operation or other procedure may be disposed of by and at the discretion of the [facility] Office staff and [address]. 5.  I understand that in the event of a medical emergency, I will be transported by local paramedics to [facility] or other \[facility]"" emergency department. 6.  I certify that I have read and fully understand the above consent to operation and/or other procedure. 7.  I acknowledge that my physician has explained sedation/analgesia administration to me including the risks and benefits. I consent to the administration of sedation/analgesia as may be necessary or desirable in the judgement of my physician. Witness signature: ___________________________________________________ Date:  ______/______/_____                    Time:  ________ A. M.  P.M.        Patient Name:  Lawrence Flores  12/13/1985  [id number]       Patient signature: ___________________________________________________                      Statement of Physician  My signature below affirms that prior to the time of the procedure, I have explained to the patient and/or his/her guardian, the risks and benefits involved in the proposed treatment and any reasonable alternative to the proposed treatment. I have also explained the risks and benefits involved in the refusal of the proposed treatment and have answered the patient's questions.                         Date:  ______/______/_______  Provider                      Signature:  __________________________________________________________       Time:  ___________ ANICOLASA CASTAÑEDA

## (undated) NOTE — LETTER
Date: March 3, 2025      Patient Name: Jim Cat      : 1985        Thank you for choosing [facility] as your health care provider. Your physician has deemed the following medical service(s) necessary. However, your insurance plan may not pay for all of your health care and costs and may deny payment for this service. The fact that your insurance plan does not pay for an item or service does not mean you should not receive it. The purpose of this form is to help you make an informed decision about whether or not you want to receive this service(s) that may not be paid for by your insurance plan.    CPT Code Description     Cost     RIGHT biceps tendon sheath CSI under US       I understand that the above mentioned service(s) or supply may not be covered by my insurance company. I agree to be financially responsible for the cost of this service or supply in the event of my insurance denies payment as a non-covered benefit.        ______________________________________________________________________  Signature of Patient or Patient's Representative  Relationship  Date    ______________________________________________________________________  Signature of Witness to signing of form   Printed Name

## (undated) NOTE — LETTER
AUTHORIZATION FOR SURGICAL OPERATION OR OTHER PROCEDURE    1. I hereby authorize Dr. Ashli Marsh and the [facility] staff assigned to my case to perform the following operation and/or procedure at the [facility]:    RIGHT [facility] under ultrasound guidance    2. My physician has explained the nature and purpose of the operation or other procedure, possible alternative methods of treatment, the risks involved, and the possibility of complication to me. I acknowledge that no guarantee has been made as to the result that may be obtained. 3.  I recognize that, during the course of this operation, or other procedure, unforseen conditions may necessitate additional or different procedure than those listed above. I, therefore, further authorize and request that the above named physician, his/her physician assistants or designees perform such procedures as are, in his/her professional opinion, necessary and desirable. 4.  Any tissue or organs removed in the operation or other procedure may be disposed of by and at the discretion of the [facility] staff and [address]. 5.  I understand that in the event of a medical emergency, I will be transported by local paramedics to [facility] or other \[facility]\"" emergency department. 6.  I certify that I have read and fully understand the above consent to operation and/or other procedure. 7.  I acknowledge that my physician has explained sedation/analgesia administration to me including the risks and benefits. I consent to the administration of sedation/analgesia as may be necessary or desirable in the judgement of my physician. Witness signature: ___________________________________________________ Date:  ______/______/_____                    Time:  ________ A. MGareth Barba [facility]  12/13/1985  [id number]         Patient signature:  ___________________________________________________              Statement of Physician  My signature below affirms that prior to the time of the procedure, I have explained to the patient and/or his/her guardian, the risks and benefits involved in the proposed treatment and any reasonable alternative to the proposed treatment. I have also explained the risks and benefits involved in the refusal of the proposed treatment and have answered the patient's questions.                         Date:  ______/______/_______  Provider                      Signature:  __________________________________________________________       Time:  ___________ A.M    P.M.

## (undated) NOTE — LETTER
Date: 2022      Patient Name: Urbano Guzman      : 1985        Thank you for choosing [facility] as your health care provider. Your physician has deemed the following medical service(s) necessary. However, your insurance plan may not pay for all of your health care and costs and may deny payment for this service. The fact that your insurance plan does not pay for an item or service does not mean you should not receive it. The purpose of this form is to help you make an informed decision about whether or not you want to receive this service(s) that may not be paid for by your insurance plan. CPT Code Description     Cost     RIGHT subacromial CSI under ultrasound guidance CPT 89301+   $1200.00    I understand that the above mentioned service(s) or supply may not be covered by my insurance company.  I agree to be financially responsible for the cost of this service or supply in the event of my insurance denies payment as a non-covered benefit.        ______________________________________________________________________  Signature of Patient or Patient's Representative  Relationship  Date    ______________________________________________________________________  Signature of Witness to signing of form   Printed Name

## (undated) NOTE — LETTER
Date: 2023      Patient Name: Olena Loo      : 1985        Thank you for choosing [facility] as your health care provider. Your physician has deemed the following medical service(s) necessary. However, your insurance plan may not pay for all of your health care and costs and may deny payment for this service. The fact that your insurance plan does not pay for an item or service does not mean you should not receive it. The purpose of this form is to help you make an informed decision about whether or not you want to receive this service(s) that may not be paid for by your insurance plan. CPT Code Description     Cost     RIGHT [facility] CSI under ultrasound guidance                                  I understand that the above mentioned service(s) or supply may not be covered by my insurance company.  I agree to be financially responsible for the cost of this service or supply in the event of my insurance denies payment as a non-covered benefit.        ______________________________________________________________________  Signature of Patient or Patient's Representative  Relationship  Date    ______________________________________________________________________  Signature of Witness to signing of form   Printed Name

## (undated) NOTE — LETTER
Date: 2023      Patient Name: Rufus Pereyra      : 1985        Thank you for choosing [facility] as your health care provider. Your physician has deemed the following medical service(s) necessary. However, your insurance plan may not pay for all of your health care and costs and may deny payment for this service. The fact that your insurance plan does not pay for an item or service does not mean you should not receive it. The purpose of this form is to help you make an informed decision about whether or not you want to receive this service(s) that may not be paid for by your insurance plan. CPT Code Description     Cost     Right subacromial CSI under ultrasound guidance       _________ ______________________________ _____________      _________ ______________________________ _____________      I understand that the above mentioned service(s) or supply may not be covered by my insurance company.  I agree to be financially responsible for the cost of this service or supply in the event of my insurance denies payment as a non-covered benefit.        ______________________________________________________________________  Signature of Patient or Patient's Representative  Relationship  Date    ______________________________________________________________________  Signature of Witness to signing of form   Printed Name

## (undated) NOTE — LETTER
Date: 2024      Patient Name: Jim Cat      : 1985        Thank you for choosing [facility] as your health care provider. Your physician has deemed the following medical service(s) necessary. However, your insurance plan may not pay for all of your health care and costs and may deny payment for this service. The fact that your insurance plan does not pay for an item or service does not mean you should not receive it. The purpose of this form is to help you make an informed decision about whether or not you want to receive this service(s) that may not be paid for by your insurance plan.    CPT Code Description     Cost     RIGHT [facility] Csi under ultrasound guidance       I understand that the above mentioned service(s) or supply may not be covered by my insurance company. I agree to be financially responsible for the cost of this service or supply in the event of my insurance denies payment as a non-covered benefit.        ______________________________________________________________________  Signature of Patient or Patient's Representative  Relationship  Date    ______________________________________________________________________  Signature of Witness to signing of form   Printed Name